# Patient Record
Sex: FEMALE | Employment: FULL TIME | URBAN - METROPOLITAN AREA
[De-identification: names, ages, dates, MRNs, and addresses within clinical notes are randomized per-mention and may not be internally consistent; named-entity substitution may affect disease eponyms.]

---

## 2024-10-08 ENCOUNTER — TELEPHONE (OUTPATIENT)
Dept: ORTHOPEDICS | Facility: CLINIC | Age: 49
End: 2024-10-08
Payer: COMMERCIAL

## 2024-10-08 NOTE — TELEPHONE ENCOUNTER
----- Message from Blanka Miller sent at 10/8/2024 11:15 AM CDT -----  Regarding: New Referral  Initial call completed, Fill-able uploaded     needed     No Specialist   No known allergies or skin conditions     Dental   -pt see dentist in [place] has appt on 10/9/2024  -advised pt to get dentist office phone and fax number

## 2024-10-23 ENCOUNTER — TELEPHONE (OUTPATIENT)
Dept: ORTHOPEDICS | Facility: CLINIC | Age: 49
End: 2024-10-23
Payer: COMMERCIAL

## 2024-10-23 RX ORDER — METFORMIN HYDROCHLORIDE 500 MG/1
500 TABLET, EXTENDED RELEASE ORAL
COMMUNITY
Start: 2024-08-08

## 2024-10-23 RX ORDER — OMEGA-3-ACID ETHYL ESTERS 1 G/1
2 CAPSULE, LIQUID FILLED ORAL 2 TIMES DAILY
COMMUNITY
Start: 2024-07-18

## 2024-10-23 RX ORDER — ROSUVASTATIN CALCIUM 10 MG/1
10 TABLET, COATED ORAL NIGHTLY
COMMUNITY
Start: 2024-08-08

## 2024-10-23 RX ORDER — IBUPROFEN 800 MG/1
800 TABLET ORAL 3 TIMES DAILY
COMMUNITY
Start: 2024-07-31

## 2024-10-23 NOTE — TELEPHONE ENCOUNTER
Language Line:  [phone] Larry    - no WC/litigation   - no active cancer/tx  - diabetic - A1c is 6.3; aware of program requirement  - BMI requirement discussed - current BMI 38.6  (63.5 in, 101.3kg)  - no nicotine use      Surgery:   needs both - start with  right Knee    Travel caregiver:  daughter    Will travel via: plane    Social Hx:     Updated in Epic              Patient works at [employer]            Plan to return home after surgery:  Yes            Have support to help with care and appointments: Yes     Allergies:  none    Medication list:    Updated in Epic    Covid-19:   - vaccine: no   - diagnosed with: yes, no hospitalization/O2 use/inhaler        Health Hx:   Updated in [facility]    Surgical Hx:    Updated in Epic    Can you take one flight of stairs: yes    RCRI:   - Coronary Artery Disease: no   - Congestive Heart Failure: no   - Cerebrovascular Disease: no   - Diabetes Mellitus on insulin: no      ROS:   - Fever/chills: no   - Infection: no   - Cough/Resp Issues:  no   - Bleeding/blood loss (vaginal, rectal, vomiting, urination):  no   - GI issues/Acid Reflux/Vomiting: no   - Fatty liver: no   - UTI: no   - Constipation:  yes occasionally - discussed p/o bowel mgmt plan   - MRSA colonization: no  - Skin issues (rashes, blisters, boils, bumps): no; will let us know if that changes   - Recent injection in operative joint: no   - Strokes or Passing out: no   - Blood Thinners or ASA: no   - Blood clot in heart/lung/extremity: no   - Stents: no   - Depression or Anxiety: no   - Problems with anesthesia:  no   - If female, having periods/pregnant: No  - Vision problems: yes; glasses   - Dental exam recently: yes    STOPBANG   - S  snore loudly no   - T  tired during the day no   - O  stop breathing in sleep no   - P  BP/HTN no   - B  BMI    - A  over 50 No   - N  neck size   - G  male gender No    Family Hx:    Updated in Epic        Knee replacement patients will have several weeks of PT after returning home.   Have the option for in person or virtual PT.  Wants in-person PT. She will call [employer] RN to help find an in-network PT provider.  Discussed stay, DME, ARMOND, p/o appts, PT - ample time allowed for question and answer, v/u of all teaching

## 2024-10-25 ENCOUNTER — DOCUMENTATION ONLY (OUTPATIENT)
Dept: INTERNAL MEDICINE | Facility: CLINIC | Age: 49
End: 2024-10-25
Payer: COMMERCIAL

## 2024-10-25 ENCOUNTER — TELEPHONE (OUTPATIENT)
Dept: ORTHOPEDICS | Facility: CLINIC | Age: 49
End: 2024-10-25
Payer: COMMERCIAL

## 2024-10-25 ENCOUNTER — PATIENT MESSAGE (OUTPATIENT)
Dept: ORTHOPEDICS | Facility: CLINIC | Age: 49
End: 2024-10-25
Payer: COMMERCIAL

## 2024-10-25 DIAGNOSIS — E11.9 TYPE 2 DIABETES MELLITUS WITHOUT COMPLICATION, WITHOUT LONG-TERM CURRENT USE OF INSULIN: ICD-10-CM

## 2024-10-25 DIAGNOSIS — E78.2 MIXED HYPERLIPIDEMIA: Primary | ICD-10-CM

## 2024-10-25 DIAGNOSIS — M17.12 PRIMARY OSTEOARTHRITIS OF LEFT KNEE: ICD-10-CM

## 2024-10-25 DIAGNOSIS — M17.11 PRIMARY OSTEOARTHRITIS OF RIGHT KNEE: Primary | ICD-10-CM

## 2024-10-25 DIAGNOSIS — M17.11 PRIMARY OSTEOARTHRITIS OF RIGHT KNEE: ICD-10-CM

## 2024-10-25 NOTE — PROGRESS NOTES
[facility]  Progress Note    Patient Name: Faith Bolton  MRN: [id number]  Date of Evaluation- 10/25/2024  PCP- No primary care provider on file.      This patient 's history was reviewed by chart in preparation for : Orthopedic Surgery    Medical problem list:  Mixed hyperlipidemia  Osteoarthritis of right knee  Obesity  Newly diagnosed DM  Constipation  Mildly elevated LFTs          Labs/EKG Review: Acceptable for surgery    Significant findings: None        Optimization:    PCP: 9/6/24  There are no significant issues that need to be addressed before scheduling procedure.      Thanks,          Georges Rabago NP  Perioperative Medicine  [facility]

## 2024-10-25 NOTE — TELEPHONE ENCOUNTER
Language Line Carolyne [phone]      Contacted patient - advised approved for surgery - offered dates - requesting 12/17  We discussed dates for stay, ARMOND, etc, will send portal message today with next steps     Pt will let me know if she has questions

## 2024-10-30 ENCOUNTER — TELEPHONE (OUTPATIENT)
Dept: ORTHOPEDICS | Facility: CLINIC | Age: 49
End: 2024-10-30
Payer: COMMERCIAL

## 2024-12-13 ENCOUNTER — ANESTHESIA EVENT (OUTPATIENT)
Dept: SURGERY | Facility: HOSPITAL | Age: 49
DRG: 470 | End: 2024-12-13
Payer: COMMERCIAL

## 2024-12-15 NOTE — HPI
This is a 49 y.o. female  who presents today with  for a preoperative evaluation in preparation for right knee arthroplasty.  Surgery is indicated for  osteoarthritis of right knee.   Patient is new to me.  The history has been obtained by speaking with the patient and reviewing the electronic medical record and/or outside health information. Significant health conditions for the perioperative period are discussed below in assessment and plan.   Patient reports current health status to be Good.  Denies any new symptoms before surgery.

## 2024-12-15 NOTE — ASSESSMENT & PLAN NOTE
Currently takes:   metformin    Most recent A1c is 6.3.      Reports peripheral neuropathy- right hand .   Denies visual changes. Up to date with eye exams.  Micro changes: Denies- Retinopathy, Nephropathy   Macro changes: Denies-  Carotid, Coronary , Peripheral disease

## 2024-12-15 NOTE — PROGRESS NOTES
[facility]  Progress Note    Patient Name: Faith Bolton  MRN: [id number]  Date of Evaluation- 12/16/2024  PCP- No primary care provider on file.    Future cases for Faith Veronica [[id number]]       Case ID Status Date Time Omar Procedure Provider Location    [id number] [facility] 12/17/2024  7:00  ARTHROPLASTY, KNEE, TOTAL, USING COMPUTER-ASSISTED NAVIGATION -  - Bull Justice MD [[id number]] [facility] OR            HPI:  This is a 49 y.o. female  who presents today with  for a preoperative evaluation in preparation for right knee arthroplasty.  Surgery is indicated for  osteoarthritis of right knee.   Patient is new to me.  The history has been obtained by speaking with the patient and reviewing the electronic medical record and/or outside health information. Significant health conditions for the perioperative period are discussed below in assessment and plan.   Patient reports current health status to be Good.  Denies any new symptoms before surgery.       Subjective/ Objective:     Chief Complaint: Preoperative evaulation, perioperative medical management, and complication reduction plan.     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       Anesthesia issues: PONV- hysterectomy    Difficulty mouth opening: No    Steroid use in the last 12 months:  No     Dental Issues: None    Family anesthesia difficulty: None         Family Hx of Thrombosis: None    Past Medical History:   Diagnosis Date    Adenomyosis     Anemia, unspecified     Mixed hyperlipidemia     Osteoarthritis of wrist     bilateral    Primary osteoarthritis of left knee     Primary osteoarthritis of right knee     Type 2 diabetes mellitus without complications     Uterine leiomyoma          Past Medical History Pertinent Negatives:   Diagnosis Date Noted    Anxiety 12/16/2024    Asthma 12/16/2024    COPD (chronic obstructive pulmonary disease)  "12/16/2024    Coronary artery disease 12/16/2024    Deep vein thrombosis 12/16/2024    Depression 12/16/2024    Disorder of kidney and ureter 12/16/2024    Hypertension 12/16/2024    Pulmonary embolism 12/16/2024    Seizures 12/16/2024    Stroke 12/16/2024    Thyroid disease 12/16/2024         Past Surgical History:   Procedure Laterality Date    BILATERAL TUBAL LIGATION      LAPAROSCOPY-ASSISTED VAGINAL HYSTERECTOMY         Review of Systems   Constitutional:  Negative for chills, fatigue, fever and unexpected weight change.   HENT:  Negative for congestion, hearing loss, rhinorrhea, sore throat, tinnitus and trouble swallowing.    Eyes:  Negative for visual disturbance.   Respiratory:  Negative for cough, chest tightness, shortness of breath and wheezing.    Cardiovascular:  Negative for chest pain, palpitations and leg swelling.   Gastrointestinal:  Positive for constipation (Fiber). Negative for diarrhea.        Denies Fatty liver, Hepatitis   Genitourinary:  Positive for urgency. Negative for decreased urine volume, difficulty urinating, dysuria, frequency and hematuria.        Incontinence   Musculoskeletal:  Positive for arthralgias (right knee). Negative for back pain, neck pain and neck stiffness.   Skin:  Negative for rash and wound.   Neurological:  Positive for numbness (right hand). Negative for dizziness, syncope, weakness and headaches.   Hematological:  Bruises/bleeds easily.   Psychiatric/Behavioral:  Negative for sleep disturbance and suicidal ideas.               VITALS  Visit Vitals  BP (!) 126/58 (BP Location: Left arm, Patient Position: Sitting)   Pulse (!) 58   Temp 97.9 °F (36.6 °C) (Oral)   Ht 5' 5" (1.651 m)   Wt 98 kg (216 lb 0.8 oz)   SpO2 98%   BMI 35.95 kg/m²          Physical Exam  Vitals reviewed.   Constitutional:       General: She is not in acute distress.     Appearance: She is well-developed. She is obese.   HENT:      Head: Normocephalic.      Nose: Nose normal.      " Mouth/Throat:      Pharynx: No oropharyngeal exudate.   Eyes:      General:         Right eye: No discharge.         Left eye: No discharge.      Conjunctiva/sclera: Conjunctivae normal.      Pupils: Pupils are equal, round, and reactive to light.   Neck:      Thyroid: No thyromegaly.      Vascular: No carotid bruit or JVD.      Trachea: No tracheal deviation.   Cardiovascular:      Rate and Rhythm: Regular rhythm. Bradycardia present.      Pulses:           Carotid pulses are 2+ on the right side and 2+ on the left side.       Dorsalis pedis pulses are 2+ on the right side and 2+ on the left side.        Posterior tibial pulses are 2+ on the right side and 2+ on the left side.      Heart sounds: Normal heart sounds. No murmur heard.  Pulmonary:      Effort: Pulmonary effort is normal. No respiratory distress.      Breath sounds: Normal breath sounds. No stridor. No wheezing, rhonchi or rales.   Abdominal:      General: Bowel sounds are normal. There is no distension.      Palpations: Abdomen is soft.      Tenderness: There is no guarding.   Musculoskeletal:      Cervical back: Normal range of motion. No pain with movement.      Right lower leg: No edema.      Left lower leg: No edema.   Lymphadenopathy:      Cervical: No cervical adenopathy.   Skin:     General: Skin is warm and dry.      Capillary Refill: Capillary refill takes less than 2 seconds.      Findings: No erythema or rash.   Neurological:      Mental Status: She is alert and oriented to person, place, and time.   Psychiatric:         Behavior: Behavior normal. Behavior is cooperative.          Significant Labs:          EKG:                 Active Cardiac Conditions: None      Revised Cardiac Risk Index   High -Risk Surgery  Intraperitoneal; Intrathoracic; suprainguinal vascular Yes- + 1 No- 0   History of Ischemic Heart Disease   (Hx of MI/positive exercise test/current chest pain due to ischemia/use of nitrate therapy/EKG with pathological Q waves)  Yes- + 1 No- 0   History of CHF  (Pulmonary edema/bilateral rales or S3 gallop/PND/CXR showing pulmonary vascular redistribution) Yes- + 1 No- 0   History of CVA   (Prior stroke or TIA) Yes- + 1 No- 0   Pre-operative treatment with insulin Yes- + 1 No- 0   Pre-operative creatinine > 2mg/dl Yes- + 1 No- 0   Total: 0      Risk Status:  Estimated risk of cardiac complications after non-cardiac surgery using the Revised Cardiac Risk Index for Preoperative risk is 3.9 %      ARISCAT (Canet) risk index: Low: 1.6% risk of post-op pulmonary complications; Intermediate: 13.3% risk of post-op pulmonary complications if surgery is > 3 hours.     American Society of Anesthesiologists Physical Status classification (ASA): 3                            Assessment/Plan:     Primary osteoarthritis of right knee  Scheduled with Dr. Donahue on 12/17/24 for right knee arthroplasty.    Mixed hyperlipidemia  Recommend weight loss, healthy diet (DASH/Mediterranean) and exercise.   Patient should exercise 30 minutes at least five times weekly once recovered from surgery.   Treated with: Crestor    Type 2 diabetes mellitus without complications  Currently takes:   metformin    Most recent A1c is 6.3.      Reports peripheral neuropathy- right hand .   Denies visual changes. Up to date with eye exams.  Micro changes: Denies- Retinopathy, Nephropathy   Macro changes: Denies-  Carotid, Coronary , Peripheral disease         Elevated LFTs  Per outside labs  AST= 42  ALT = 38  I recommend care with the usage of medications that are hepatotoxic or metabolized in the liver.  Continue monitoring with PCP    Acid reflux  Occasional symptoms- relieved with drinking more water.    Asymptomatic varicose veins of both lower extremities  Encouraged compression stockings to reduce leg fatigue, swelling, or pain.   Increased risk of thrombosis.      Discussion/Management of Perioperative Care    Thromboembolic prophylaxis (VTE) Care: Risk factors for  thrombosis include: obesity, surgical procedure, and varicose veins.  I recommend prophylaxis of thromboembolism with the use of compression stockings/pneumatic devices, and/or pharmacologic agents. The benefits should outweigh the risks for pharmacologic prophylaxis in the perioperative period. I also encourage early ambulation if not contraindicated during the post-operative period.    Risk factors for post-operative pulmonary complications include:diabetes mellitus. To reduce the risk of pulmonary complications, prophylactic recommendations include: early ambulation and pain control.    Risk factors for renal complications include: diabetes mellitus. To reduce the risk of postoperative renal complications, I recommend the patient maintain adequate hydration.  Avoid/reduce NSAIDS and CALERO-2 inhibitors use as well as IV contrast for renal protection.    I recommend the use of appropriate prophylactic antibiotics to reduce the risk of surgical site infections.      The patient is at an increased risk for urinary retention due to : possible regional anesthesia. I recommend to avoid/decrease the use of benzodiazepines, anticholinergics, and Benadryl in the perioperative period. I also recommend using opioids for the shortest period of time if possible.          This visit was focused on Preoperative evaluation, Perioperative Medical management, complication reduction plans. I suggest that the patient follows up with primary care or relevant sub specialists for ongoing health care.    I appreciate the opportunity to be involved in this patients care. Please feel free to contact me if there were any questions about this consultation.      I spent a total of 64 minutes on the day of the visit.  This includes face to face time and non-face to face time preparing to see the patient (e.g., review of tests), obtaining and/or reviewing separately obtained history, documenting clinical information in the electronic or other  health record, independently interpreting results and communicating results to the patient/family/caregiver, or care coordinator.       Patient is optimized for surgery.      Georges Rabago NP  Perioperative Medicine  [facility]

## 2024-12-15 NOTE — OUTPATIENT SUBJECTIVE & OBJECTIVE
Outpatient Subjective & Objective      Chief Complaint: Preoperative evaulation, perioperative medical management, and complication reduction plan.     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       Anesthesia issues: PONV- hysterectomy    Difficulty mouth opening: No    Steroid use in the last 12 months:  No     Dental Issues: None    Family anesthesia difficulty: None         Family Hx of Thrombosis: None    Past Medical History:   Diagnosis Date    Adenomyosis     Anemia, unspecified     Mixed hyperlipidemia     Osteoarthritis of wrist     bilateral    Primary osteoarthritis of left knee     Primary osteoarthritis of right knee     Type 2 diabetes mellitus without complications     Uterine leiomyoma          Past Medical History Pertinent Negatives:   Diagnosis Date Noted    Anxiety 12/16/2024    Asthma 12/16/2024    COPD (chronic obstructive pulmonary disease) 12/16/2024    Coronary artery disease 12/16/2024    Deep vein thrombosis 12/16/2024    Depression 12/16/2024    Disorder of kidney and ureter 12/16/2024    Hypertension 12/16/2024    Pulmonary embolism 12/16/2024    Seizures 12/16/2024    Stroke 12/16/2024    Thyroid disease 12/16/2024         Past Surgical History:   Procedure Laterality Date    BILATERAL TUBAL LIGATION      LAPAROSCOPY-ASSISTED VAGINAL HYSTERECTOMY         Review of Systems   Constitutional:  Negative for chills, fatigue, fever and unexpected weight change.   HENT:  Negative for congestion, hearing loss, rhinorrhea, sore throat, tinnitus and trouble swallowing.    Eyes:  Negative for visual disturbance.   Respiratory:  Negative for cough, chest tightness, shortness of breath and wheezing.    Cardiovascular:  Negative for chest pain, palpitations and leg swelling.   Gastrointestinal:  Positive for constipation (Fiber). Negative for diarrhea.        Denies Fatty liver, Hepatitis   Genitourinary:  Positive for urgency. Negative  "for decreased urine volume, difficulty urinating, dysuria, frequency and hematuria.        Incontinence   Musculoskeletal:  Positive for arthralgias (right knee). Negative for back pain, neck pain and neck stiffness.   Skin:  Negative for rash and wound.   Neurological:  Positive for numbness (right hand). Negative for dizziness, syncope, weakness and headaches.   Hematological:  Bruises/bleeds easily.   Psychiatric/Behavioral:  Negative for sleep disturbance and suicidal ideas.               VITALS  Visit Vitals  BP (!) 126/58 (BP Location: Left arm, Patient Position: Sitting)   Pulse (!) 58   Temp 97.9 °F (36.6 °C) (Oral)   Ht 5' 5" (1.651 m)   Wt 98 kg (216 lb 0.8 oz)   SpO2 98%   BMI 35.95 kg/m²          Physical Exam  Vitals reviewed.   Constitutional:       General: She is not in acute distress.     Appearance: She is well-developed. She is obese.   HENT:      Head: Normocephalic.      Nose: Nose normal.      Mouth/Throat:      Pharynx: No oropharyngeal exudate.   Eyes:      General:         Right eye: No discharge.         Left eye: No discharge.      Conjunctiva/sclera: Conjunctivae normal.      Pupils: Pupils are equal, round, and reactive to light.   Neck:      Thyroid: No thyromegaly.      Vascular: No carotid bruit or JVD.      Trachea: No tracheal deviation.   Cardiovascular:      Rate and Rhythm: Regular rhythm. Bradycardia present.      Pulses:           Carotid pulses are 2+ on the right side and 2+ on the left side.       Dorsalis pedis pulses are 2+ on the right side and 2+ on the left side.        Posterior tibial pulses are 2+ on the right side and 2+ on the left side.      Heart sounds: Normal heart sounds. No murmur heard.  Pulmonary:      Effort: Pulmonary effort is normal. No respiratory distress.      Breath sounds: Normal breath sounds. No stridor. No wheezing, rhonchi or rales.   Abdominal:      General: Bowel sounds are normal. There is no distension.      Palpations: Abdomen is soft.     "  Tenderness: There is no guarding.   Musculoskeletal:      Cervical back: Normal range of motion. No pain with movement.      Right lower leg: No edema.      Left lower leg: No edema.   Lymphadenopathy:      Cervical: No cervical adenopathy.   Skin:     General: Skin is warm and dry.      Capillary Refill: Capillary refill takes less than 2 seconds.      Findings: No erythema or rash.   Neurological:      Mental Status: She is alert and oriented to person, place, and time.   Psychiatric:         Behavior: Behavior normal. Behavior is cooperative.          Significant Labs:          EKG:                 Active Cardiac Conditions: None      Revised Cardiac Risk Index   High -Risk Surgery  Intraperitoneal; Intrathoracic; suprainguinal vascular Yes- + 1 No- 0   History of Ischemic Heart Disease   (Hx of MI/positive exercise test/current chest pain due to ischemia/use of nitrate therapy/EKG with pathological Q waves) Yes- + 1 No- 0   History of CHF  (Pulmonary edema/bilateral rales or S3 gallop/PND/CXR showing pulmonary vascular redistribution) Yes- + 1 No- 0   History of CVA   (Prior stroke or TIA) Yes- + 1 No- 0   Pre-operative treatment with insulin Yes- + 1 No- 0   Pre-operative creatinine > 2mg/dl Yes- + 1 No- 0   Total: 0      Risk Status:  Estimated risk of cardiac complications after non-cardiac surgery using the Revised Cardiac Risk Index for Preoperative risk is 3.9 %      ARISCAT (Canet) risk index: Low: 1.6% risk of post-op pulmonary complications; Intermediate: 13.3% risk of post-op pulmonary complications if surgery is > 3 hours.     American Society of Anesthesiologists Physical Status classification (ASA): 3             Outpatient Subjective & Objective

## 2024-12-16 ENCOUNTER — OFFICE VISIT (OUTPATIENT)
Dept: ORTHOPEDICS | Facility: CLINIC | Age: 49
End: 2024-12-16
Payer: COMMERCIAL

## 2024-12-16 ENCOUNTER — HOSPITAL ENCOUNTER (OUTPATIENT)
Dept: RADIOLOGY | Facility: HOSPITAL | Age: 49
Discharge: HOME | End: 2024-12-16
Attending: ORTHOPAEDIC SURGERY
Payer: COMMERCIAL

## 2024-12-16 ENCOUNTER — TELEPHONE (OUTPATIENT)
Dept: ORTHOPEDICS | Facility: CLINIC | Age: 49
End: 2024-12-16

## 2024-12-16 ENCOUNTER — OFFICE VISIT (OUTPATIENT)
Dept: INTERNAL MEDICINE | Facility: CLINIC | Age: 49
End: 2024-12-16
Payer: COMMERCIAL

## 2024-12-16 VITALS — WEIGHT: 221 LBS | BODY MASS INDEX: 37.73 KG/M2 | HEIGHT: 64 IN

## 2024-12-16 VITALS
HEIGHT: 65 IN | BODY MASS INDEX: 36 KG/M2 | DIASTOLIC BLOOD PRESSURE: 58 MMHG | WEIGHT: 216.06 LBS | HEART RATE: 58 BPM | TEMPERATURE: 98 F | OXYGEN SATURATION: 98 % | SYSTOLIC BLOOD PRESSURE: 126 MMHG

## 2024-12-16 DIAGNOSIS — Z96.651 S/P TOTAL KNEE REPLACEMENT, RIGHT: ICD-10-CM

## 2024-12-16 DIAGNOSIS — R79.89 ELEVATED LFTS: ICD-10-CM

## 2024-12-16 DIAGNOSIS — I83.93 ASYMPTOMATIC VARICOSE VEINS OF BOTH LOWER EXTREMITIES: ICD-10-CM

## 2024-12-16 DIAGNOSIS — M17.11 PRIMARY OSTEOARTHRITIS OF RIGHT KNEE: ICD-10-CM

## 2024-12-16 DIAGNOSIS — Z01.818 PREOPERATIVE EXAMINATION: Primary | ICD-10-CM

## 2024-12-16 DIAGNOSIS — E78.2 MIXED HYPERLIPIDEMIA: ICD-10-CM

## 2024-12-16 DIAGNOSIS — K21.9 GASTROESOPHAGEAL REFLUX DISEASE, UNSPECIFIED WHETHER ESOPHAGITIS PRESENT: ICD-10-CM

## 2024-12-16 DIAGNOSIS — M17.11 PRIMARY OSTEOARTHRITIS OF RIGHT KNEE: Primary | ICD-10-CM

## 2024-12-16 DIAGNOSIS — M17.12 PRIMARY OSTEOARTHRITIS OF LEFT KNEE: ICD-10-CM

## 2024-12-16 DIAGNOSIS — E11.9 TYPE 2 DIABETES MELLITUS WITHOUT COMPLICATION, WITHOUT LONG-TERM CURRENT USE OF INSULIN: ICD-10-CM

## 2024-12-16 PROCEDURE — 99999 PR PBB SHADOW E&M-EST. PATIENT-LVL III: CPT | Mod: PBBFAC,COE,, | Performed by: NURSE PRACTITIONER

## 2024-12-16 PROCEDURE — 99999 PR PBB SHADOW E&M-EST. PATIENT-LVL III: CPT | Mod: PBBFAC,COE,, | Performed by: ORTHOPAEDIC SURGERY

## 2024-12-16 PROCEDURE — 73700 CT LOWER EXTREMITY W/O DYE: CPT | Mod: TC,RT

## 2024-12-16 PROCEDURE — 73562 X-RAY EXAM OF KNEE 3: CPT | Mod: TC,50

## 2024-12-16 PROCEDURE — 99499 UNLISTED E&M SERVICE: CPT | Mod: S$GLB,COE,, | Performed by: ORTHOPAEDIC SURGERY

## 2024-12-16 PROCEDURE — 99205 OFFICE O/P NEW HI 60 MIN: CPT | Mod: S$GLB,COE,, | Performed by: NURSE PRACTITIONER

## 2024-12-16 PROCEDURE — 99499 UNLISTED E&M SERVICE: CPT | Mod: S$GLB,COE,, | Performed by: NURSE PRACTITIONER

## 2024-12-16 RX ORDER — SODIUM CHLORIDE 9 MG/ML
INJECTION, SOLUTION INTRAVENOUS
Status: CANCELLED | OUTPATIENT
Start: 2024-12-16

## 2024-12-16 RX ORDER — AMOXICILLIN 250 MG
1 CAPSULE ORAL 2 TIMES DAILY
Status: CANCELLED | OUTPATIENT
Start: 2024-12-16

## 2024-12-16 RX ORDER — POLYETHYLENE GLYCOL 3350 17 G/17G
17 POWDER, FOR SOLUTION ORAL DAILY
Status: CANCELLED | OUTPATIENT
Start: 2024-12-17

## 2024-12-16 RX ORDER — SODIUM CHLORIDE 0.9 % (FLUSH) 0.9 %
10 SYRINGE (ML) INJECTION
Status: CANCELLED | OUTPATIENT
Start: 2024-12-16

## 2024-12-16 RX ORDER — METHOCARBAMOL 750 MG/1
750 TABLET, FILM COATED ORAL 3 TIMES DAILY
Status: CANCELLED | OUTPATIENT
Start: 2024-12-16

## 2024-12-16 RX ORDER — OXYCODONE HYDROCHLORIDE 5 MG/1
5 TABLET ORAL
Status: CANCELLED | OUTPATIENT
Start: 2024-12-16

## 2024-12-16 RX ORDER — AMOXICILLIN 250 MG
1 CAPSULE ORAL DAILY
Qty: 30 TABLET | Refills: 0 | Status: SHIPPED | OUTPATIENT
Start: 2024-12-16

## 2024-12-16 RX ORDER — ONDANSETRON HYDROCHLORIDE 2 MG/ML
4 INJECTION, SOLUTION INTRAVENOUS EVERY 8 HOURS PRN
Status: CANCELLED | OUTPATIENT
Start: 2024-12-16

## 2024-12-16 RX ORDER — PROCHLORPERAZINE EDISYLATE 5 MG/ML
5 INJECTION INTRAMUSCULAR; INTRAVENOUS EVERY 6 HOURS PRN
Status: CANCELLED | OUTPATIENT
Start: 2024-12-16

## 2024-12-16 RX ORDER — CELECOXIB 200 MG/1
200 CAPSULE ORAL DAILY
Qty: 30 CAPSULE | Refills: 0 | Status: SHIPPED | OUTPATIENT
Start: 2024-12-16

## 2024-12-16 RX ORDER — PREGABALIN 75 MG/1
75 CAPSULE ORAL
Status: CANCELLED | OUTPATIENT
Start: 2024-12-16

## 2024-12-16 RX ORDER — ACETAMINOPHEN 500 MG
1000 TABLET ORAL EVERY 6 HOURS
Status: CANCELLED | OUTPATIENT
Start: 2024-12-16

## 2024-12-16 RX ORDER — ACETAMINOPHEN 500 MG
1000 TABLET ORAL
Status: CANCELLED | OUTPATIENT
Start: 2024-12-16

## 2024-12-16 RX ORDER — CELECOXIB 200 MG/1
400 CAPSULE ORAL ONCE
Status: CANCELLED | OUTPATIENT
Start: 2024-12-16 | End: 2024-12-16

## 2024-12-16 RX ORDER — OXYCODONE HYDROCHLORIDE 5 MG/1
TABLET ORAL
Qty: 50 TABLET | Refills: 0 | Status: SHIPPED | OUTPATIENT
Start: 2024-12-16

## 2024-12-16 RX ORDER — MUPIROCIN 20 MG/G
1 OINTMENT TOPICAL 2 TIMES DAILY
Status: CANCELLED | OUTPATIENT
Start: 2024-12-16 | End: 2024-12-21

## 2024-12-16 RX ORDER — ASPIRIN 81 MG/1
81 TABLET ORAL 2 TIMES DAILY
Qty: 60 TABLET | Refills: 0 | Status: SHIPPED | OUTPATIENT
Start: 2024-12-16 | End: 2025-01-17

## 2024-12-16 RX ORDER — MORPHINE SULFATE 2 MG/ML
2 INJECTION, SOLUTION INTRAMUSCULAR; INTRAVENOUS
Status: CANCELLED | OUTPATIENT
Start: 2024-12-16

## 2024-12-16 RX ORDER — FENTANYL CITRATE 50 UG/ML
100 INJECTION, SOLUTION INTRAMUSCULAR; INTRAVENOUS
Status: CANCELLED | OUTPATIENT
Start: 2024-12-16 | End: 2024-12-17

## 2024-12-16 RX ORDER — OXYCODONE HYDROCHLORIDE 5 MG/1
10 TABLET ORAL
Status: CANCELLED | OUTPATIENT
Start: 2024-12-16

## 2024-12-16 RX ORDER — DEXTROMETHORPHAN HYDROBROMIDE, GUAIFENESIN 5; 100 MG/5ML; MG/5ML
650 LIQUID ORAL EVERY 8 HOURS
Qty: 120 TABLET | Refills: 0 | Status: SHIPPED | OUTPATIENT
Start: 2024-12-16

## 2024-12-16 RX ORDER — PANTOPRAZOLE SODIUM 40 MG/1
40 TABLET, DELAYED RELEASE ORAL DAILY
Qty: 30 TABLET | Refills: 0 | Status: SHIPPED | OUTPATIENT
Start: 2024-12-16 | End: 2025-01-17

## 2024-12-16 RX ORDER — METHOCARBAMOL 750 MG/1
750 TABLET, FILM COATED ORAL 4 TIMES DAILY PRN
Qty: 40 TABLET | Refills: 0 | Status: SHIPPED | OUTPATIENT
Start: 2024-12-16 | End: 2024-12-28

## 2024-12-16 RX ORDER — FENTANYL CITRATE 50 UG/ML
25 INJECTION, SOLUTION INTRAMUSCULAR; INTRAVENOUS EVERY 5 MIN PRN
Status: CANCELLED | OUTPATIENT
Start: 2024-12-16

## 2024-12-16 RX ORDER — ASPIRIN 81 MG/1
81 TABLET ORAL 2 TIMES DAILY
Status: CANCELLED | OUTPATIENT
Start: 2024-12-16

## 2024-12-16 RX ORDER — PREGABALIN 75 MG/1
75 CAPSULE ORAL NIGHTLY
Status: CANCELLED | OUTPATIENT
Start: 2024-12-16

## 2024-12-16 RX ORDER — FAMOTIDINE 20 MG/1
20 TABLET, FILM COATED ORAL 2 TIMES DAILY
Status: CANCELLED | OUTPATIENT
Start: 2024-12-16

## 2024-12-16 RX ORDER — MIDAZOLAM HYDROCHLORIDE 1 MG/ML
4 INJECTION, SOLUTION INTRAMUSCULAR; INTRAVENOUS
Status: CANCELLED | OUTPATIENT
Start: 2024-12-16 | End: 2024-12-17

## 2024-12-16 RX ORDER — CELECOXIB 200 MG/1
200 CAPSULE ORAL DAILY
Status: CANCELLED | OUTPATIENT
Start: 2024-12-17

## 2024-12-16 RX ORDER — BISACODYL 10 MG/1
10 SUPPOSITORY RECTAL EVERY 12 HOURS PRN
Status: CANCELLED | OUTPATIENT
Start: 2024-12-16

## 2024-12-16 RX ORDER — MUPIROCIN 20 MG/G
1 OINTMENT TOPICAL
Status: CANCELLED | OUTPATIENT
Start: 2024-12-16

## 2024-12-16 RX ORDER — SODIUM CHLORIDE 9 MG/ML
INJECTION, SOLUTION INTRAVENOUS CONTINUOUS
Status: CANCELLED | OUTPATIENT
Start: 2024-12-16 | End: 2024-12-17

## 2024-12-16 RX ORDER — NALOXONE HCL 0.4 MG/ML
0.02 VIAL (ML) INJECTION
Status: CANCELLED | OUTPATIENT
Start: 2024-12-16

## 2024-12-16 RX ORDER — TALC
6 POWDER (GRAM) TOPICAL NIGHTLY PRN
Status: CANCELLED | OUTPATIENT
Start: 2024-12-16

## 2024-12-16 RX ORDER — LIDOCAINE HYDROCHLORIDE 10 MG/ML
1 INJECTION, SOLUTION EPIDURAL; INFILTRATION; INTRACAUDAL; PERINEURAL
Status: CANCELLED | OUTPATIENT
Start: 2024-12-16

## 2024-12-16 NOTE — PROGRESS NOTES
Faith Bolton is a 49 y.o. year old here today for preoperative visit in preparation for a Right total knee arthroplasty to be performed by Dr. Donahue on 12/17/24.  she was last seen and treated in the clinic on 12/16/2024. she will be medically optimized by the pre op center. There has been no significant change in medical status since last visit. No fever, chills, malaise, or unexplained weight change.      Allergies, Medications, past medical and surgical history reviewed.    Focused examination performed.    Patient saw surgeon in clinic today. All questions answered. Patient encouraged to call with questions. Contact information given.     Pre, sal, and post operative procedures and expectations discussed. Goals of successful surgery reviewed and include manageable pain levels, surgical site free of infection, medication management, and ambulation with PT/OT assistance. Healthy weight management discussed with patient and caregiver who were receptive to eduction of healthy diet and activity. No other necessary lifestyle changes identified. Educated patient about signs and symptoms of infection, medication management, anticoagulation therapy, risk of tobacco and alcohol use, and self-care to promote healing. Surgical guide given for future reference. Hibiclens given to patient with instructions. All questions were answered.     aFith Bolton verbalized an understanding to the education and goals. Patient has displayed readiness to engage in care and is ready to proceed with surgery.  Patient reports her daughter is able and ready to provide assistance at home after discharge.    Surgical and blood consents signed.    DME will be arranged. The mobility limitation cannot be sufficiently resolved by the use of a cane. Patient's functional mobility deficit can be sufficiently resolved with the use of a (Rolling Walker or Walker). Patient's mobility limitation significantly impairs their ability to  "participate in one of more activities of daily living. The use of a (Rolling Walker or Walker) will significantly improve the patient's ability to participate in MRADLS and the patient will use it on regular basis in the home."     Faith Bolton will contact us if there are any questions, concerns, or changes in medical status prior to surgery.       Joint class: private with [facility]. Citizen of [place] booklet provided.    Patient has discussed discharge planning with surgeon. Patient will be discharged to home following surgery.   patient will be scheduled with [facility]. PT will be done at the [facility].    30 minutes of time was spent on patient education, review of records, templating, H&P, , appointment scheduling and optimizing patient for surgery.      "

## 2024-12-16 NOTE — DISCHARGE INSTRUCTIONS
Your surgery has been scheduled for:_____12/17/24_____________________________________    You should report to:  ____[facility], located on the [place] side of the first floor of the           [facility] ([phone])  ____[facility], located on the [facility] side of the            Second floor of the [facility] [phone])  ____3rd Floor [facility] located on the [facility] side of the [facility] [phone]  __X__[facility]: located at [address] [place] [address]. [address]     Please Note   Tell your doctor if you take Aspirin, products containing Aspirin, herbal medications  or blood thinners, such as Coumadin, Ticlid, or Plavix.  (Consult your provider regarding holding or stopping before surgery).  Arrange for someone to drive you home following surgery.  You will not be allowed to leave the surgical facility alone or drive yourself home following sedation and anesthesia.    Before Surgery  Stop taking all herbal medications, vitamins, and supplements 7 days prior to surgery  No Motrin/Advil (Ibuprofen) 7 days before surgery  No Aleve (Naproxen) 7 days before surgery   No Goody's/BC Powder 7 days before surgery  Refrain from drinking alcoholic beverages for 24 hours before and after surgery  Stop or limit smoking at least 24 hours prior to surgery  You may take Tylenol for pain    Night before Surgery  Do not eat or drink after midnight  Take a shower or bath (shower is recommended).  Bathe with Hibiclens soap or an antibacterial soap from the neck down.  If not supplied by your surgeon, hibiclens soap will need to be purchased over the counter in pharmacy.  Rinse soap off thoroughly.  Shampoo your hair with your regular shampoo    The Day of Surgery  Take another bath or shower with hibiclens or any antibacterial soap, to reduce the chance of infection.  Take heart  and blood pressure medications with a small sip of water, as advised by the perioperative team.  Do not take fluid pills  You may brush your teeth and rinse your mouth, but do not swallow any additional water.   Do not apply perfumes, powder, body lotions or deodorant on the day of surgery.  Nail polish should be removed.  Do not wear makeup or moisturizer  Wear comfortable clothes, such as a button front shirt and loose fitting pants.  Leave all jewelry, including body piercings, and valuables at home.    Bring any devices you will need after surgery such as crutches or canes.  If you have sleep apnea, please bring your CPAP machine  In the event that your physical condition changes including the onset of a cold or respiratory illness, or if you have to delay or cancel your surgery, please notify your surgeon.

## 2024-12-16 NOTE — ASSESSMENT & PLAN NOTE
Per outside labs  AST= 42  ALT = 38  I recommend care with the usage of medications that are hepatotoxic or metabolized in the liver.  Continue monitoring with PCP

## 2024-12-16 NOTE — PROGRESS NOTES
Subjective:      Patient ID: Faith Bolton is a 49 y.o. female.    Chief Complaint: Pain of the Right Knee  VISIT DONE WITH     History of Present Illness    CHIEF COMPLAINT:  - Faith presents today for evaluation of right knee pain that has been present for approximately four years.    HPI:  - presents with right knee pain ongoing for approximately four years  - reports pain in the medial side of the right knee that radiates down her leg  - feels like the knee is going to give way when walking, indicating stability issues  - experiences difficulties with various activities: walking for extended periods, going up and down stairs, getting in and out of a car, and getting dressed  - has received injections as treatment, with the last injection providing relief for a few months  - last HbA1c test was performed about three months ago  - denies any history of blood clots    WORK STATUS:  - Works as a cook at [employer]  - Stands for 9 hours a day  - Knee pain affecting mobility at work  - Difficulty walking for long periods         Past Medical History:   Diagnosis Date    Adenomyosis     Anemia, unspecified     Mixed hyperlipidemia     Osteoarthritis of wrist     bilateral    Primary osteoarthritis of left knee     Primary osteoarthritis of right knee     Type 2 diabetes mellitus without complications     Uterine leiomyoma      Past Surgical History:   Procedure Laterality Date    BILATERAL TUBAL LIGATION      LAPAROSCOPY-ASSISTED VAGINAL HYSTERECTOMY       Family History   Problem Relation Name Age of Onset    Osteoarthritis Mother      Hypertension Mother      Diabetes Mother      No Known Problems Father      Diabetes Brother       Social History     Socioeconomic History    Marital status: Unknown   Tobacco Use    Smoking status: Never    Smokeless tobacco: Never   Substance and Sexual Activity    Alcohol use: Not Currently    Drug use: Never     Social Drivers of Health     Financial Resource  Strain: Medium Risk (12/9/2024)    Overall Financial Resource Strain (CARDIA)     Difficulty of Paying Living Expenses: Somewhat hard   Food Insecurity: No Food Insecurity (12/9/2024)    Hunger Vital Sign     Worried About Running Out of Food in the Last Year: Never true     Ran Out of Food in the Last Year: Never true   Physical Activity: Insufficiently Active (12/9/2024)    Exercise Vital Sign     Days of Exercise per Week: 2 days     Minutes of Exercise per Session: 10 min   Stress: No Stress Concern Present (12/9/2024)    [employer] - Occupational Stress Questionnaire     Feeling of Stress : Only a little   Housing Stability: Unknown (12/9/2024)    Housing Stability Vital Sign     Unable to Pay for Housing in the Last Year: No     Current Outpatient Medications on File Prior to Visit   Medication Sig Dispense Refill    ibuprofen (ADVIL,MOTRIN) 800 MG tablet Take 800 mg by mouth 3 (three) times daily.      metFORMIN (GLUCOPHAGE-XR) 500 MG ER 24hr tablet Take 500 mg by mouth.      omega-3 acid ethyl esters (LOVAZA) 1 gram capsule Take 2 capsules by mouth 2 (two) times daily.      rosuvastatin (CRESTOR) 10 MG tablet Take 10 mg by mouth every evening.       No current facility-administered medications on file prior to visit.     Review of patient's allergies indicates:  No Known Allergies    Review of Systems   Constitutional: Negative for chills, fever and night sweats.   HENT:  Negative for hearing loss.    Eyes:  Negative for blurred vision and double vision.   Cardiovascular:  Negative for chest pain, claudication and leg swelling.   Respiratory:  Negative for shortness of breath.    Endocrine: Negative for polydipsia, polyphagia and polyuria.   Hematologic/Lymphatic: Negative for adenopathy and bleeding problem. Does not bruise/bleed easily.   Skin:  Negative for poor wound healing.   Gastrointestinal:  Negative for diarrhea and heartburn.   Genitourinary:  Negative for bladder  "incontinence.   Neurological:  Negative for focal weakness, headaches, numbness, paresthesias and sensory change.   Psychiatric/Behavioral:  The patient is not nervous/anxious.    Allergic/Immunologic: Negative for persistent infections.         Objective:      Body mass index is 37.73 kg/m².  Vitals:    24 1417   Weight: 100.3 kg (221 lb 0.2 oz)   Height: 5' 4.17" (1.63 m)         General    Constitutional: She is oriented to person, place, and time. She appears well-developed and well-nourished.   HENT:   Head: Normocephalic and atraumatic.   Eyes: EOM are normal.   Cardiovascular:  Normal rate.            Pulmonary/Chest: Effort normal.   Neurological: She is alert and oriented to person, place, and time.   Psychiatric: She has a normal mood and affect. Her behavior is normal.     General Musculoskeletal Exam   Gait: normal       Right Knee Exam     Inspection   Erythema: absent  Scars: absent  Swelling: present  Effusion: absent  Deformity: absent  Bruising: absent    Tenderness   The patient is tender to palpation of the medial joint line and patella.    Range of Motion   Extension:  0   Flexion:  120     Tests   Ligament Examination   Lachman: normal (-1 to 2mm)   MCL - Valgus: normal (0 to 2mm)  LCL - Varus: normal  Patella   Passive Patellar Tilt: neutral    Other   Sensation: normal    Left Knee Exam     Inspection   Erythema: absent  Scars: absent  Swelling: absent  Effusion: absent  Deformity: absent  Bruising: absent    Tenderness   The patient is experiencing no tenderness.     Range of Motion   Extension:  0   Flexion:  130     Tests   Stability   Lachman: normal (-1 to 2mm)   MCL - Valgus: normal (0 to 2mm)  LCL - Varus: normal (0 to 2mm)  Patella   Passive Patellar Tilt: neutral    Other   Sensation: normal    Muscle Strength   Right Lower Extremity   Hip Abduction: 5/5   Quadriceps:  5/5   Hamstrin/5   Left Lower Extremity   Hip Abduction: 5/5   Quadriceps:  5/5   Hamstrin/5 "     Vascular Exam     Right Pulses  Dorsalis Pedis:      2+          Left Pulses  Dorsalis Pedis:      2+          Edema  Right Lower Leg: absent  Left Lower Leg: absent      Reviewed by me:  IMAGING:  - Knee radiographs right knee: Kellgren-Jose grade 3 changes with the medial compartment being most involved and significant patellofemoral degenerative change, subchondral cysts and sclerosis, very mild varus deformity,        Assessment:       Encounter Diagnosis   Name Primary?    Primary osteoarthritis of right knee Yes          Plan:       Faith was seen today for pain.    Diagnoses and all orders for this visit:    Primary osteoarthritis of right knee      Treatment options were discussed. The surgical process of robotically assisted right knee replacement was discussed in detail with the patient including a detailed discussion of the procedure itself (including visual model, x-ray review, and literature review). We discussed options including implant type and why I believe the implant selected is a good match for the patient's needs. The patient expressed understanding and agrees to proceed with the planned surgeryThe typical perioperative and post-operative course was discussed and perioperative risks were discussed to the patient's satisfaction.  Risks and complications discussed included but were not limited to the risks of anesthetic complications, infection, bleeding, wound healing complications, fracture through the pin sights, stiffness, aseptic loosening, instability, limb length inequality, neurologic dysfunction including numbness and weakness, additional surgery,  DVT, pulmonary embolism, perioperative medical risks (cardiac, pulmonary, renal, neurologic), and death and the patient elects to proceed.  The patient should get medically cleared and attend the joint seminar.      ASA for DVT prophylaxis    STERLING            This note was generated with the assistance of ambient listening technology.  Verbal consent was obtained by the patient and accompanying visitor(s) for the recording of patient appointment to facilitate this note. I attest to having reviewed and edited the generated note for accuracy, though some syntax or spelling errors may persist. Please contact the author of this note for any clarification.

## 2024-12-16 NOTE — H&P
CC:  Right knee pain    Faith Bolton is a 49 y.o. female with history of Right knee pain. Pain is worse with activity and weight bearing.  Patient has experienced interference of activities of daily living due to decreased range of motion and an increase in joint pain and swelling.  Patient has failed non-operative treatment including NSAIDs, corticosteroid injections, viscosupplement injections, and activity modification.  Faith Bolton currently ambulates independently.     Relevant medical conditions of significance in perioperative period:  DM- on medication managed by pcp  HLD- on medication managed by pcp     Given documented medical comorbidities including those listed above and based off of CMS criteria patient would meet inpatient admission status guidelines. This case has been approved as inpatient.     Past Medical History:   Diagnosis Date    Adenomyosis     Anemia, unspecified     Mixed hyperlipidemia     Osteoarthritis of wrist     bilateral    Primary osteoarthritis of left knee     Primary osteoarthritis of right knee     Type 2 diabetes mellitus without complications     Uterine leiomyoma        Past Surgical History:   Procedure Laterality Date    BILATERAL TUBAL LIGATION      LAPAROSCOPY-ASSISTED VAGINAL HYSTERECTOMY         Family History   Problem Relation Name Age of Onset    Osteoarthritis Mother      Hypertension Mother      Diabetes Mother      No Known Problems Father      Diabetes Brother         Review of patient's allergies indicates:  No Known Allergies      Current Outpatient Medications:     ergocalciferol, vitamin D2, (VITAMIN D2 ORAL), Take 1.25 mg by mouth. weekly, Disp: , Rfl:     ibuprofen (ADVIL,MOTRIN) 800 MG tablet, Take 800 mg by mouth 3 (three) times daily., Disp: , Rfl:     metFORMIN (GLUCOPHAGE-XR) 500 MG ER 24hr tablet, Take 500 mg by mouth., Disp: , Rfl:     omega-3 acid ethyl esters (LOVAZA) 1 gram capsule, Take 2 capsules by mouth 2 (two) times daily.,  Disp: , Rfl:     rosuvastatin (CRESTOR) 10 MG tablet, Take 10 mg by mouth every evening., Disp: , Rfl:     Review of Systems:  Constitutional: no fever or chills  Eyes: no visual changes  ENT: no nasal congestion or sore throat  Respiratory: no cough or shortness of breath  Cardiovascular: no chest pain or palpitations  Gastrointestinal: no nausea or vomiting, tolerating diet  Genitourinary: no hematuria or dysuria  Integument/Breast: no rash or pruritis  Hematologic/Lymphatic: no easy bruising or lymphadenopathy  Musculoskeletal: positive for knee pain  Neurological: no seizures or tremors  Behavioral/Psych: no auditory or visual hallucinations  Endocrine: no heat or cold intolerance    PE:  There were no vitals taken for this visit.  General: Pleasant, cooperative, NAD   Gait: antalgic  HEENT: NCAT, sclera nonicteric   Lungs: Respirations clear bilaterally; equal and unlabored.   CV: S1S2; 2+ bilateral upper and lower extremity pulses.   Skin: Intact throughout with no rashes, erythema, or lesions  Extremities: No LE edema,  no erythema or warmth of the skin in either lower extremity.    Right knee exam:  Knee Range of Motion:normal, pain with passive range of motion  Effusion:none  Condition of skin:intact  Location of tenderness:Medial joint line   Strength:normal  Stability: stable to testing    Hip Examination: painless PROM of hip     Radiographs: Radiographs reveal advanced degenerative changes including subchondral cyst formation, subchondral sclerosis, osteophyte formation, joint space narrowing.     Knee Alignment: normal    Diagnosis: Primary osteoarthritis Right knee    Plan: Right total knee arthroplasty    Due to the serious nature of total joint infection and the high prevalence of community acquired MRSA, vancomycin will be used perioperatively.

## 2024-12-16 NOTE — TELEPHONE ENCOUNTER
Met with patient during visit in clinic.  Discussed all pre/post op instructions - hibiclens given and use explained, NPO after midnight, expectations post op, bruising, swelling, ice, elevation, p/o d/c meds, DME, bowel mgmt, role of caregiver in patient's care, dressing over incision, PT, blue armband and purpose, when to call  //me/hotline, workflow for stay, my checkins, and [facility] paperwork.    Pt states she has not scheduled PT yet - says they haven't gotten orders.  These were emailed in October and faxed at end of Nov/first of Dec.   Emailed and faxed again now. Attempted to reach PT loc multiple times without success.  Left very detailed voicemail with direct call back number to speak with someone.    Pt states that she's changed PCP's because Glo Serrano, NP, is out of network.  Patient's new PCP is Dr. Joss Grant.  [phone]   Called and spoke with  - explained situation and asked to speak with provider staff to see if they would be willing to do post op since Glo is OON.   She took down information and stated she would have someone call me back.      Arrival time given to patient and  - 0730     [phone]

## 2024-12-17 ENCOUNTER — ANESTHESIA (OUTPATIENT)
Dept: SURGERY | Facility: HOSPITAL | Age: 49
DRG: 470 | End: 2024-12-17
Payer: COMMERCIAL

## 2024-12-17 ENCOUNTER — TELEPHONE (OUTPATIENT)
Dept: ORTHOPEDICS | Facility: CLINIC | Age: 49
End: 2024-12-17
Payer: COMMERCIAL

## 2024-12-17 ENCOUNTER — HOSPITAL ENCOUNTER (INPATIENT)
Facility: HOSPITAL | Age: 49
LOS: 1 days | Discharge: HOME | DRG: 470 | End: 2024-12-18
Attending: ORTHOPAEDIC SURGERY | Admitting: ORTHOPAEDIC SURGERY
Payer: COMMERCIAL

## 2024-12-17 DIAGNOSIS — M17.11 PRIMARY OSTEOARTHRITIS OF RIGHT KNEE: ICD-10-CM

## 2024-12-17 LAB
POCT GLUCOSE: 113 MG/DL (ref 70–110)
POCT GLUCOSE: 126 MG/DL (ref 70–110)
POCT GLUCOSE: 149 MG/DL (ref 70–110)

## 2024-12-17 PROCEDURE — 63600175 PHARM REV CODE 636 W HCPCS: Performed by: ANESTHESIOLOGY

## 2024-12-17 PROCEDURE — 63600175 PHARM REV CODE 636 W HCPCS: Performed by: NURSE ANESTHETIST, CERTIFIED REGISTERED

## 2024-12-17 PROCEDURE — 63600175 PHARM REV CODE 636 W HCPCS: Performed by: NURSE PRACTITIONER

## 2024-12-17 PROCEDURE — 94761 N-INVAS EAR/PLS OXIMETRY MLT: CPT

## 2024-12-17 PROCEDURE — 71000039 HC RECOVERY, EACH ADD'L HOUR: Performed by: ORTHOPAEDIC SURGERY

## 2024-12-17 PROCEDURE — 97165 OT EVAL LOW COMPLEX 30 MIN: CPT

## 2024-12-17 PROCEDURE — 36000713 HC OR TIME LEV V EA ADD 15 MIN: Performed by: ORTHOPAEDIC SURGERY

## 2024-12-17 PROCEDURE — 64447 NJX AA&/STRD FEMORAL NRV IMG: CPT | Performed by: ANESTHESIOLOGY

## 2024-12-17 PROCEDURE — 8E0Y0CZ ROBOTIC ASSISTED PROCEDURE OF LOWER EXTREMITY, OPEN APPROACH: ICD-10-PCS | Performed by: ORTHOPAEDIC SURGERY

## 2024-12-17 PROCEDURE — 25000003 PHARM REV CODE 250

## 2024-12-17 PROCEDURE — 97162 PT EVAL MOD COMPLEX 30 MIN: CPT

## 2024-12-17 PROCEDURE — 99900035 HC TECH TIME PER 15 MIN (STAT)

## 2024-12-17 PROCEDURE — 25000003 PHARM REV CODE 250: Performed by: ANESTHESIOLOGY

## 2024-12-17 PROCEDURE — 25000003 PHARM REV CODE 250: Performed by: NURSE PRACTITIONER

## 2024-12-17 PROCEDURE — 97116 GAIT TRAINING THERAPY: CPT

## 2024-12-17 PROCEDURE — 27201423 OPTIME MED/SURG SUP & DEVICES STERILE SUPPLY: Performed by: ORTHOPAEDIC SURGERY

## 2024-12-17 PROCEDURE — 0SRC0J9 REPLACEMENT OF RIGHT KNEE JOINT WITH SYNTHETIC SUBSTITUTE, CEMENTED, OPEN APPROACH: ICD-10-PCS | Performed by: ORTHOPAEDIC SURGERY

## 2024-12-17 PROCEDURE — 37000009 HC ANESTHESIA EA ADD 15 MINS: Performed by: ORTHOPAEDIC SURGERY

## 2024-12-17 PROCEDURE — C1776 JOINT DEVICE (IMPLANTABLE): HCPCS | Performed by: ORTHOPAEDIC SURGERY

## 2024-12-17 PROCEDURE — 25000003 PHARM REV CODE 250: Performed by: NURSE ANESTHETIST, CERTIFIED REGISTERED

## 2024-12-17 PROCEDURE — 11000001 HC ACUTE MED/SURG PRIVATE ROOM

## 2024-12-17 PROCEDURE — 37000008 HC ANESTHESIA 1ST 15 MINUTES: Performed by: ORTHOPAEDIC SURGERY

## 2024-12-17 PROCEDURE — 63600175 PHARM REV CODE 636 W HCPCS: Performed by: PHYSICIAN ASSISTANT

## 2024-12-17 PROCEDURE — 36000712 HC OR TIME LEV V 1ST 15 MIN: Performed by: ORTHOPAEDIC SURGERY

## 2024-12-17 PROCEDURE — C1713 ANCHOR/SCREW BN/BN,TIS/BN: HCPCS | Performed by: ORTHOPAEDIC SURGERY

## 2024-12-17 PROCEDURE — 97535 SELF CARE MNGMENT TRAINING: CPT

## 2024-12-17 PROCEDURE — 82962 GLUCOSE BLOOD TEST: CPT | Performed by: ORTHOPAEDIC SURGERY

## 2024-12-17 PROCEDURE — 71000033 HC RECOVERY, INTIAL HOUR: Performed by: ORTHOPAEDIC SURGERY

## 2024-12-17 DEVICE — PRIMARY TIBIAL BASEPLATE
Type: IMPLANTABLE DEVICE | Site: KNEE | Status: FUNCTIONAL
Brand: TRIATHLON

## 2024-12-17 DEVICE — PATELLA
Type: IMPLANTABLE DEVICE | Site: KNEE | Status: FUNCTIONAL
Brand: TRIATHLON

## 2024-12-17 DEVICE — SIMPLEX® HV WITH GENTAMICIN IS A FAST-SETTING ACRYLIC RESIN WITH ADDITION OF GENTAMICIN SULFATE FOR USE IN BONE SURGERY. MIXING THE TWO SEPARATE STERILE COMPONENTS PRODUCES A DUCTILE BONE CEMENT WHICH, AFTER HARDENING, FIXES THE IMPLANT AND TRANSFERS STRESSES PRODUCED DURING MOVEMENT EVENLY TO THE BONE. SIMPLEX® HV WITH GENTAMICINN CEMENT POWDER ALSO CONTAINS INSOLUBLE ZIRCONIUM DIOXIDE AS AN X-RAY CONTRAST MEDIUM. SIMPLEX® HV WITH GENTAMICIN DOES NOT EMIT A SIGNAL AND DOES NOT POSE A SAFETY RISK IN A MAGNETIC RESONANCE ENVIRONMENT.
Type: IMPLANTABLE DEVICE | Site: KNEE | Status: FUNCTIONAL
Brand: SIMPLEX HV WITH GENTAMICIN

## 2024-12-17 DEVICE — CRUCIATE RETAINING FEMORAL
Type: IMPLANTABLE DEVICE | Site: KNEE | Status: FUNCTIONAL
Brand: TRIATHLON

## 2024-12-17 DEVICE — TIBIAL BEARING INSERT
Type: IMPLANTABLE DEVICE | Site: KNEE | Status: FUNCTIONAL
Brand: TRIATHLON

## 2024-12-17 RX ORDER — NALOXONE HCL 0.4 MG/ML
0.02 VIAL (ML) INJECTION
Status: DISCONTINUED | OUTPATIENT
Start: 2024-12-17 | End: 2024-12-18 | Stop reason: HOSPADM

## 2024-12-17 RX ORDER — OXYCODONE HYDROCHLORIDE 5 MG/1
5 TABLET ORAL
Status: DISCONTINUED | OUTPATIENT
Start: 2024-12-17 | End: 2024-12-18 | Stop reason: HOSPADM

## 2024-12-17 RX ORDER — MUPIROCIN 20 MG/G
1 OINTMENT TOPICAL 2 TIMES DAILY
Status: DISCONTINUED | OUTPATIENT
Start: 2024-12-17 | End: 2024-12-18 | Stop reason: HOSPADM

## 2024-12-17 RX ORDER — SODIUM CHLORIDE 9 MG/ML
INJECTION, SOLUTION INTRAVENOUS CONTINUOUS
Status: DISCONTINUED | OUTPATIENT
Start: 2024-12-17 | End: 2024-12-17

## 2024-12-17 RX ORDER — IBUPROFEN 200 MG
16 TABLET ORAL
Status: DISCONTINUED | OUTPATIENT
Start: 2024-12-17 | End: 2024-12-18 | Stop reason: HOSPADM

## 2024-12-17 RX ORDER — PROCHLORPERAZINE EDISYLATE 5 MG/ML
5 INJECTION INTRAMUSCULAR; INTRAVENOUS EVERY 6 HOURS PRN
Status: DISCONTINUED | OUTPATIENT
Start: 2024-12-17 | End: 2024-12-18 | Stop reason: HOSPADM

## 2024-12-17 RX ORDER — DEXMEDETOMIDINE HYDROCHLORIDE 100 UG/ML
INJECTION, SOLUTION INTRAVENOUS
Status: DISCONTINUED | OUTPATIENT
Start: 2024-12-17 | End: 2024-12-17

## 2024-12-17 RX ORDER — TALC
6 POWDER (GRAM) TOPICAL NIGHTLY PRN
Status: DISCONTINUED | OUTPATIENT
Start: 2024-12-17 | End: 2024-12-17 | Stop reason: HOSPADM

## 2024-12-17 RX ORDER — ONDANSETRON HYDROCHLORIDE 2 MG/ML
INJECTION, SOLUTION INTRAVENOUS
Status: DISCONTINUED | OUTPATIENT
Start: 2024-12-17 | End: 2024-12-17

## 2024-12-17 RX ORDER — GLUCAGON 1 MG
1 KIT INJECTION
Status: DISCONTINUED | OUTPATIENT
Start: 2024-12-17 | End: 2024-12-18 | Stop reason: HOSPADM

## 2024-12-17 RX ORDER — SODIUM CHLORIDE 0.9 % (FLUSH) 0.9 %
3 SYRINGE (ML) INJECTION
Status: DISCONTINUED | OUTPATIENT
Start: 2024-12-17 | End: 2024-12-17 | Stop reason: HOSPADM

## 2024-12-17 RX ORDER — MORPHINE SULFATE 2 MG/ML
2 INJECTION, SOLUTION INTRAMUSCULAR; INTRAVENOUS
Status: DISCONTINUED | OUTPATIENT
Start: 2024-12-17 | End: 2024-12-18 | Stop reason: HOSPADM

## 2024-12-17 RX ORDER — ONDANSETRON HYDROCHLORIDE 2 MG/ML
4 INJECTION, SOLUTION INTRAVENOUS EVERY 8 HOURS PRN
Status: DISCONTINUED | OUTPATIENT
Start: 2024-12-17 | End: 2024-12-18 | Stop reason: HOSPADM

## 2024-12-17 RX ORDER — PREGABALIN 75 MG/1
75 CAPSULE ORAL
Status: COMPLETED | OUTPATIENT
Start: 2024-12-17 | End: 2024-12-17

## 2024-12-17 RX ORDER — IBUPROFEN 200 MG
24 TABLET ORAL
Status: DISCONTINUED | OUTPATIENT
Start: 2024-12-17 | End: 2024-12-18 | Stop reason: HOSPADM

## 2024-12-17 RX ORDER — DOCUSATE SODIUM 100 MG
400 CAPSULE ORAL
Status: DISCONTINUED | OUTPATIENT
Start: 2024-12-17 | End: 2024-12-18 | Stop reason: HOSPADM

## 2024-12-17 RX ORDER — CELECOXIB 200 MG/1
400 CAPSULE ORAL ONCE
Status: COMPLETED | OUTPATIENT
Start: 2024-12-17 | End: 2024-12-17

## 2024-12-17 RX ORDER — PREGABALIN 75 MG/1
75 CAPSULE ORAL NIGHTLY
Status: DISCONTINUED | OUTPATIENT
Start: 2024-12-17 | End: 2024-12-18 | Stop reason: HOSPADM

## 2024-12-17 RX ORDER — SODIUM CHLORIDE 0.9 % (FLUSH) 0.9 %
10 SYRINGE (ML) INJECTION
Status: DISCONTINUED | OUTPATIENT
Start: 2024-12-17 | End: 2024-12-17 | Stop reason: HOSPADM

## 2024-12-17 RX ORDER — FENTANYL CITRATE 50 UG/ML
100 INJECTION, SOLUTION INTRAMUSCULAR; INTRAVENOUS
Status: DISCONTINUED | OUTPATIENT
Start: 2024-12-17 | End: 2024-12-17 | Stop reason: HOSPADM

## 2024-12-17 RX ORDER — KETAMINE HCL IN 0.9 % NACL 50 MG/5 ML
SYRINGE (ML) INTRAVENOUS
Status: DISCONTINUED | OUTPATIENT
Start: 2024-12-17 | End: 2024-12-17

## 2024-12-17 RX ORDER — OXYCODONE HYDROCHLORIDE 5 MG/1
5 TABLET ORAL
Status: DISCONTINUED | OUTPATIENT
Start: 2024-12-17 | End: 2024-12-17 | Stop reason: HOSPADM

## 2024-12-17 RX ORDER — CEFAZOLIN 2 G/1
2 INJECTION, POWDER, FOR SOLUTION INTRAMUSCULAR; INTRAVENOUS
Status: COMPLETED | OUTPATIENT
Start: 2024-12-17 | End: 2024-12-17

## 2024-12-17 RX ORDER — PROPOFOL 10 MG/ML
VIAL (ML) INTRAVENOUS CONTINUOUS PRN
Status: DISCONTINUED | OUTPATIENT
Start: 2024-12-17 | End: 2024-12-17

## 2024-12-17 RX ORDER — METFORMIN HYDROCHLORIDE 500 MG/1
500 TABLET, EXTENDED RELEASE ORAL 2 TIMES DAILY WITH MEALS
Status: DISCONTINUED | OUTPATIENT
Start: 2024-12-17 | End: 2024-12-18 | Stop reason: HOSPADM

## 2024-12-17 RX ORDER — HALOPERIDOL 5 MG/ML
0.5 INJECTION INTRAMUSCULAR EVERY 10 MIN PRN
Status: DISCONTINUED | OUTPATIENT
Start: 2024-12-17 | End: 2024-12-17 | Stop reason: HOSPADM

## 2024-12-17 RX ORDER — ASPIRIN 81 MG/1
81 TABLET ORAL 2 TIMES DAILY
Status: DISCONTINUED | OUTPATIENT
Start: 2024-12-17 | End: 2024-12-18 | Stop reason: HOSPADM

## 2024-12-17 RX ORDER — ACETAMINOPHEN 500 MG
1000 TABLET ORAL EVERY 6 HOURS
Status: DISCONTINUED | OUTPATIENT
Start: 2024-12-17 | End: 2024-12-18 | Stop reason: HOSPADM

## 2024-12-17 RX ORDER — FENTANYL CITRATE 50 UG/ML
INJECTION, SOLUTION INTRAMUSCULAR; INTRAVENOUS
Status: DISCONTINUED | OUTPATIENT
Start: 2024-12-17 | End: 2024-12-17

## 2024-12-17 RX ORDER — FENTANYL CITRATE 50 UG/ML
25 INJECTION, SOLUTION INTRAMUSCULAR; INTRAVENOUS EVERY 5 MIN PRN
Status: DISCONTINUED | OUTPATIENT
Start: 2024-12-17 | End: 2024-12-17 | Stop reason: HOSPADM

## 2024-12-17 RX ORDER — LIDOCAINE HYDROCHLORIDE 20 MG/ML
INJECTION INTRAVENOUS
Status: DISCONTINUED | OUTPATIENT
Start: 2024-12-17 | End: 2024-12-17

## 2024-12-17 RX ORDER — ROPIVACAINE HYDROCHLORIDE 5 MG/ML
INJECTION, SOLUTION EPIDURAL; INFILTRATION; PERINEURAL
Status: COMPLETED | OUTPATIENT
Start: 2024-12-17 | End: 2024-12-17

## 2024-12-17 RX ORDER — OXYCODONE HYDROCHLORIDE 5 MG/1
10 TABLET ORAL
Status: DISCONTINUED | OUTPATIENT
Start: 2024-12-17 | End: 2024-12-18 | Stop reason: HOSPADM

## 2024-12-17 RX ORDER — POLYETHYLENE GLYCOL 3350 17 G/17G
17 POWDER, FOR SOLUTION ORAL DAILY
Status: DISCONTINUED | OUTPATIENT
Start: 2024-12-17 | End: 2024-12-18 | Stop reason: HOSPADM

## 2024-12-17 RX ORDER — LIDOCAINE HYDROCHLORIDE 10 MG/ML
1 INJECTION, SOLUTION EPIDURAL; INFILTRATION; INTRACAUDAL; PERINEURAL
Status: DISCONTINUED | OUTPATIENT
Start: 2024-12-17 | End: 2024-12-17 | Stop reason: HOSPADM

## 2024-12-17 RX ORDER — ACETAMINOPHEN 500 MG
1000 TABLET ORAL
Status: COMPLETED | OUTPATIENT
Start: 2024-12-17 | End: 2024-12-17

## 2024-12-17 RX ORDER — ATORVASTATIN CALCIUM 40 MG/1
40 TABLET, FILM COATED ORAL NIGHTLY
Status: DISCONTINUED | OUTPATIENT
Start: 2024-12-17 | End: 2024-12-18 | Stop reason: HOSPADM

## 2024-12-17 RX ORDER — BISACODYL 10 MG/1
10 SUPPOSITORY RECTAL EVERY 12 HOURS PRN
Status: DISCONTINUED | OUTPATIENT
Start: 2024-12-17 | End: 2024-12-18 | Stop reason: HOSPADM

## 2024-12-17 RX ORDER — DEXAMETHASONE SODIUM PHOSPHATE 4 MG/ML
INJECTION, SOLUTION INTRA-ARTICULAR; INTRALESIONAL; INTRAMUSCULAR; INTRAVENOUS; SOFT TISSUE
Status: DISCONTINUED | OUTPATIENT
Start: 2024-12-17 | End: 2024-12-17

## 2024-12-17 RX ORDER — MUPIROCIN 20 MG/G
1 OINTMENT TOPICAL
Status: COMPLETED | OUTPATIENT
Start: 2024-12-17 | End: 2024-12-17

## 2024-12-17 RX ORDER — CEFAZOLIN 2 G/1
2 INJECTION, POWDER, FOR SOLUTION INTRAMUSCULAR; INTRAVENOUS
Status: COMPLETED | OUTPATIENT
Start: 2024-12-17 | End: 2024-12-18

## 2024-12-17 RX ORDER — PROPOFOL 10 MG/ML
VIAL (ML) INTRAVENOUS
Status: DISCONTINUED | OUTPATIENT
Start: 2024-12-17 | End: 2024-12-17

## 2024-12-17 RX ORDER — METHOCARBAMOL 500 MG/1
1000 TABLET, FILM COATED ORAL ONCE
Status: COMPLETED | OUTPATIENT
Start: 2024-12-17 | End: 2024-12-17

## 2024-12-17 RX ORDER — INSULIN ASPART 100 [IU]/ML
0-10 INJECTION, SOLUTION INTRAVENOUS; SUBCUTANEOUS
Status: DISCONTINUED | OUTPATIENT
Start: 2024-12-17 | End: 2024-12-18 | Stop reason: HOSPADM

## 2024-12-17 RX ORDER — SODIUM CHLORIDE 9 MG/ML
INJECTION, SOLUTION INTRAVENOUS
Status: DISCONTINUED | OUTPATIENT
Start: 2024-12-17 | End: 2024-12-17 | Stop reason: HOSPADM

## 2024-12-17 RX ORDER — ROPIVACAINE HYDROCHLORIDE 2 MG/ML
INJECTION, SOLUTION EPIDURAL; INFILTRATION; PERINEURAL CONTINUOUS
Status: DISCONTINUED | OUTPATIENT
Start: 2024-12-17 | End: 2024-12-18 | Stop reason: HOSPADM

## 2024-12-17 RX ORDER — AMOXICILLIN 250 MG
1 CAPSULE ORAL 2 TIMES DAILY
Status: DISCONTINUED | OUTPATIENT
Start: 2024-12-17 | End: 2024-12-18 | Stop reason: HOSPADM

## 2024-12-17 RX ORDER — MIDAZOLAM HYDROCHLORIDE 1 MG/ML
4 INJECTION, SOLUTION INTRAMUSCULAR; INTRAVENOUS
Status: DISCONTINUED | OUTPATIENT
Start: 2024-12-17 | End: 2024-12-17 | Stop reason: HOSPADM

## 2024-12-17 RX ORDER — FAMOTIDINE 20 MG/1
20 TABLET, FILM COATED ORAL 2 TIMES DAILY
Status: DISCONTINUED | OUTPATIENT
Start: 2024-12-17 | End: 2024-12-18 | Stop reason: HOSPADM

## 2024-12-17 RX ORDER — METHOCARBAMOL 750 MG/1
750 TABLET, FILM COATED ORAL 3 TIMES DAILY
Status: DISCONTINUED | OUTPATIENT
Start: 2024-12-17 | End: 2024-12-18 | Stop reason: HOSPADM

## 2024-12-17 RX ORDER — TRANEXAMIC ACID 100 MG/ML
INJECTION, SOLUTION INTRAVENOUS
Status: DISCONTINUED | OUTPATIENT
Start: 2024-12-17 | End: 2024-12-17

## 2024-12-17 RX ADMIN — INSULIN ASPART 4 UNITS: 100 INJECTION, SOLUTION INTRAVENOUS; SUBCUTANEOUS at 05:12

## 2024-12-17 RX ADMIN — MIDAZOLAM 2 MG: 1 INJECTION INTRAMUSCULAR; INTRAVENOUS at 09:12

## 2024-12-17 RX ADMIN — TRANEXAMIC ACID 1000 MG: 100 INJECTION INTRAVENOUS at 10:12

## 2024-12-17 RX ADMIN — MEPIVACAINE HYDROCHLORIDE 3.5 ML: 15 INJECTION, SOLUTION EPIDURAL; INFILTRATION at 10:12

## 2024-12-17 RX ADMIN — DEXMEDETOMIDINE 8 MCG: 100 INJECTION, SOLUTION, CONCENTRATE INTRAVENOUS at 12:12

## 2024-12-17 RX ADMIN — VANCOMYCIN HYDROCHLORIDE 1500 MG: 1.5 INJECTION, POWDER, LYOPHILIZED, FOR SOLUTION INTRAVENOUS at 08:12

## 2024-12-17 RX ADMIN — ONDANSETRON 4 MG: 2 INJECTION INTRAMUSCULAR; INTRAVENOUS at 10:12

## 2024-12-17 RX ADMIN — PREGABALIN 75 MG: 75 CAPSULE ORAL at 08:12

## 2024-12-17 RX ADMIN — METHOCARBAMOL 1000 MG: 500 TABLET ORAL at 12:12

## 2024-12-17 RX ADMIN — ROPIVACAINE HYDROCHLORIDE 8 ML: 5 INJECTION EPIDURAL; INFILTRATION; PERINEURAL at 09:12

## 2024-12-17 RX ADMIN — Medication 400 ML: at 04:12

## 2024-12-17 RX ADMIN — METFORMIN HYDROCHLORIDE 500 MG: 500 TABLET, EXTENDED RELEASE ORAL at 05:12

## 2024-12-17 RX ADMIN — ACETAMINOPHEN 1000 MG: 500 TABLET ORAL at 11:12

## 2024-12-17 RX ADMIN — CELECOXIB 400 MG: 200 CAPSULE ORAL at 08:12

## 2024-12-17 RX ADMIN — CEFAZOLIN 2 G: 2 INJECTION, POWDER, FOR SOLUTION INTRAMUSCULAR; INTRAVENOUS at 05:12

## 2024-12-17 RX ADMIN — CEFAZOLIN 2 G: 2 INJECTION, POWDER, FOR SOLUTION INTRAMUSCULAR; INTRAVENOUS at 10:12

## 2024-12-17 RX ADMIN — OXYCODONE 5 MG: 5 TABLET ORAL at 11:12

## 2024-12-17 RX ADMIN — LIDOCAINE HYDROCHLORIDE 40 MG: 20 INJECTION INTRAVENOUS at 10:12

## 2024-12-17 RX ADMIN — Medication 30 MG: at 10:12

## 2024-12-17 RX ADMIN — SENNOSIDES AND DOCUSATE SODIUM 1 TABLET: 50; 8.6 TABLET ORAL at 08:12

## 2024-12-17 RX ADMIN — METHOCARBAMOL 750 MG: 750 TABLET ORAL at 08:12

## 2024-12-17 RX ADMIN — GLYCOPYRROLATE 0.2 MG: 0.2 INJECTION, SOLUTION INTRAMUSCULAR; INTRAVENOUS at 10:12

## 2024-12-17 RX ADMIN — ATORVASTATIN CALCIUM 40 MG: 40 TABLET, FILM COATED ORAL at 08:12

## 2024-12-17 RX ADMIN — PROPOFOL 100 MCG/KG/MIN: 10 INJECTION, EMULSION INTRAVENOUS at 10:12

## 2024-12-17 RX ADMIN — PROPOFOL 50 MG: 10 INJECTION, EMULSION INTRAVENOUS at 10:12

## 2024-12-17 RX ADMIN — TRANEXAMIC ACID 1000 MG: 100 INJECTION INTRAVENOUS at 11:12

## 2024-12-17 RX ADMIN — PROCHLORPERAZINE EDISYLATE 5 MG: 5 INJECTION INTRAMUSCULAR; INTRAVENOUS at 02:12

## 2024-12-17 RX ADMIN — SODIUM CHLORIDE: 0.9 INJECTION, SOLUTION INTRAVENOUS at 09:12

## 2024-12-17 RX ADMIN — ACETAMINOPHEN 1000 MG: 500 TABLET ORAL at 09:12

## 2024-12-17 RX ADMIN — ASPIRIN 81 MG: 81 TABLET, COATED ORAL at 08:12

## 2024-12-17 RX ADMIN — OXYCODONE 5 MG: 5 TABLET ORAL at 12:12

## 2024-12-17 RX ADMIN — MUPIROCIN 1 G: 20 OINTMENT TOPICAL at 08:12

## 2024-12-17 RX ADMIN — FAMOTIDINE 20 MG: 20 TABLET ORAL at 08:12

## 2024-12-17 RX ADMIN — Medication: at 12:12

## 2024-12-17 RX ADMIN — DEXAMETHASONE SODIUM PHOSPHATE 8 MG: 4 INJECTION, SOLUTION INTRAMUSCULAR; INTRAVENOUS at 10:12

## 2024-12-17 RX ADMIN — FENTANYL CITRATE 50 MCG: 50 INJECTION INTRAMUSCULAR; INTRAVENOUS at 09:12

## 2024-12-17 RX ADMIN — Medication 400 ML: at 08:12

## 2024-12-17 RX ADMIN — ACETAMINOPHEN 1000 MG: 500 TABLET ORAL at 05:12

## 2024-12-17 RX ADMIN — SODIUM CHLORIDE, SODIUM GLUCONATE, SODIUM ACETATE, POTASSIUM CHLORIDE, MAGNESIUM CHLORIDE, SODIUM PHOSPHATE, DIBASIC, AND POTASSIUM PHOSPHATE: .53; .5; .37; .037; .03; .012; .00082 INJECTION, SOLUTION INTRAVENOUS at 11:12

## 2024-12-17 RX ADMIN — FENTANYL CITRATE 25 MCG: 50 INJECTION, SOLUTION INTRAMUSCULAR; INTRAVENOUS at 10:12

## 2024-12-17 NOTE — OP NOTE
DATE OF PROCEDURE:  12/17/24.     PREOPERATIVE DIAGNOSIS:  Arthritis, right knee.     POSTOPERATIVE DIAGNOSIS:  Arthritis, right knee.     PROCEDURES PERFORMED:  Robotically-assisted right total knee arthroplasty.     SURGEON:  Bull Donahue M.D.     ASSISTANT:  HERO Hill     ANESTHESIA:  Regional.     COMPLICATIONS:  None.     COUNTS:  Correct.     DISPOSITION:  Recovery Room, stable.     SPECIMENS:  Bone and cartilage.     FINDINGS:  Arthritis.     FLUIDS:  2000 mL.     ESTIMATED BLOOD LOSS:  <50cc     IMPLANTS:  [place] Triathlon size 4 right  Triathlon cruciate retaining femoral component and a size 3 primary tibial baseplate, 29 mm patella and a size 3, 9 mm   CS tibial insert.     INDICATIONS FOR PROCEDURE:   Faith Bolton  is a 49-year-old female who is   having symptoms of right knee pain.  Physical examination and imaging studies   were consistent with arthritis.Treatment options were explained and it was decided   to proceed with robotically-assisted right total knee arthroplasty.  She was   aware of reasonable treatment options as well as risks and benefits.       PROCEDURE IN DETAIL:  After appropriate consent was obtained, the patient   brought in the Operating Room, anesthesia was administered.  She received   antibiotic prophylaxis.  Cast padding and tourniquet was applied to the proximal  right thigh.  right lower extremity was then prepped and draped in usual sterile   fashion.  The leg lopes was applied.  Timeout was called.  Limb was elevated   and tourniquet was inflated.  The knee was flexed.  An incision was made from   the tibial tubercle just proximal to the superior pole of the patella.  It was   taken down through the skin and retinacular.  A medial parapatellar arthrotomy   was performed followed by a medial release.  Following this ACL was resected, fat pad   was excised.  The patella was everted.  It was measured and found to be 23 mm,  8 mm of bone removed and the 3 peg  holes were drilled for the 28 mm patella.    Following this, 2 stab incisions were made 4 fingerbreadths below the tibial   tubercle on the medial aspect of the tibial crest.  The 2 guide pins were placed   along with the fixation device through these.  The array was applied and   tightened to the clamp. We checked the security of the array and it was fine. Following this, we placed the femoral pins 1 cm superior and anterior to the MCL insertion.  The check point was placed in between the pins. The guide clamp and array were secured..  Once this was accomplished, the hip center was obtained, the   medial and lateral malleoli were demarcated.  The femoral check point and tibial   check point were placed and the femur and tibia were then registered.    Acceptable registration was obtained.  Osteophytes were removed. We then captured poses in extension,   And approximately 90 degrees of flexion.  Initial alignment was 1.5 degrees valgus and 4  Degrees extension .  The  initial gaps were   then obtained. The extension gaps were 15.5 medially and 16 laterally.  The flexion gaps were 15.5 medially and 16 laterally.  Component   position was adjusted.  We were very satisfied with the component position and   sizing.  We had a size 4 femur and a 3 tibia.  Once this was accomplished, the   robotic arm was brought in and our cuts were made.  The tibia was cut 1st.  We then cut the anterior femur anterior chamfer and posterior femur.  The saw blade was then switched and we made our distal and posterior chamfer cut.  We were very satisfied all the cuts.  Bone fragments were removed.  Lamina  was used to open up posteriorly and we removed any remaining osteophytes and meniscal remnants. The PCL was intact and   robust.  We placed the tibial trial.  We had good coverage with this.  We used   the medial one-third of the tibial tubercle to guide rotation and the trial was pinned in   place.  A 9 mm insert was placed and  then the femoral component was placed.    This was centered on the femur and the knee was brought through a range of   motion.  She was very well balanced in flexion with 18.5 mm gaps medially and   laterally.  In extension, there was an 18 mm gap medially 18.5 mm gap laterally.    Clinically,there was excellent balance and stability.  Final alignment was 4 degrees flexion and 1 degrees valgus. Trial   patellar button was placed.  Patella tracked well.  Therefore, at this point, we   were satisfied with knee range of motion and stability, component position,   sizing and alignment as well as patella tracking.  It should be noted that she  had full extension and he had at least 130 degrees of flexion and there was no   instability at full extension, midflexion, or deep flexion.  Trial components   were removed.  Arrays and femoral and tibial pins were removed. The bone was then prepared for cementing for pulsatile lavage and   drying. Components were then cemented into   place. Tibia followed by femur.  Cement was applied to the tibial keel as   well.  Excess cement was removed.  The tibial insert was firmly seated.  The knee was inspected.  There was no loose body, foreign body, or soft tissue interposition.  It was   then reduced.  Patella button was cemented in place.  Once the cement was dry,   the knee was irrigated with Betadine solution.  Following this, it was irrigated   with pulsatile lavage.  This was periodically done throughout the closure.  The   incision was then closed.  The arthrotomy was closed with #1 Vicryl.  Once the   arthrotomy was closed, the knee was brought through range of motion and was   stable as previously described and the patella tracked well.  The remainder of   the incision was closed with 0 Vicryl, 2-0 Vicryl, Monocryl, and Dermabond.    The stab incisions were closed with Vicryl and Dermabond.  It should be noted   that all check points were removed as well as the femoral and  tibial pins.    Sterile dressing was applied.  She was transferred from the Operating Room table   to a stretcher, brought to Recovery Room in stable condition.  She tolerated the   procedure well and there were no known complications. A gram of IV tranexamic acid was received prior to incision and at closure.

## 2024-12-17 NOTE — PLAN OF CARE
Problem: Occupational Therapy  Goal: Occupational Therapy Goal  Description: Goals to be met by: 12/18/2024     Patient will increase functional independence with ADLs by performing:    UE Dressing with Supervision.  LE Dressing with Supervision.  Grooming while standing at sink with Supervision.  Toileting from toilet with Supervision for hygiene and clothing management.   Toilet transfer to toilet with Supervision.    Outcome: Progressing

## 2024-12-17 NOTE — ASSESSMENT & PLAN NOTE
Faith Bolton is a 49 y.o. female sp R TKA on 12/17/24. Doing well this morning.    Plan:  Pain control: multimodal  PT/OT: WBAT RLE  DVT PPx: ASA 81 mg BID, SCDs at all times when not ambulating  Abx: postop Ancef    Dispo: discharge to [facility] after therapy

## 2024-12-17 NOTE — PLAN OF CARE
Pre op checklist complete. Pt resting in bed with call light in reach. All questions answered. Pt belongings at bedside and plan to leave with pt daughter. Pt daughter at bedside.  used. Pt still needs anes consent/update/site ashish

## 2024-12-17 NOTE — TELEPHONE ENCOUNTER
Have not heard back from PT location - called again now - no answer.  Sent electronic message as well.

## 2024-12-17 NOTE — INTERVAL H&P NOTE
Faith Abraham Bolton was interviewed, examined and the H and P reviewed.  There has been no interval change in her History and Physical.

## 2024-12-17 NOTE — NURSING
Patient arrived to unit [facility]Ox[facility], In hospital bed from PACU. VSS and IVF infusing. Dressing to right knee, CDI. See assessment. Patient oriented to room. Bed in lowest position, side rails up x2, call light within reach, patient instructed to call for assistance, verbalized understanding. Will continue to monitor.     Nurses Note -- 4 Eyes      12/17/2024   1:50 PM      Skin assessed during: Admit      [x] No Altered Skin Integrity Present    []Prevention Measures Documented      [] Yes- Altered Skin Integrity Present or Discovered   [] LDA Added if Not in Epic (Describe Wound)   [] New Altered Skin Integrity was Present on Admit and Documented in LDA   [] Wound Image Taken    Wound Care Consulted? No    Attending Nurse:  JAKI Isabel    Second RN/Staff Member:   JAKI Saenz

## 2024-12-17 NOTE — ANESTHESIA POSTPROCEDURE EVALUATION
Anesthesia Post Evaluation    Patient: Faith Bolton    Procedure(s) Performed: Procedure(s) (LRB):  ARTHROPLASTY, KNEE, TOTAL, USING COMPUTER-ASSISTED NAVIGATION - right - STERLING (Right)    Final Anesthesia Type: spinal      Patient location during evaluation: PACU  Patient participation: Yes- Able to Participate  Level of consciousness: awake and alert  Post-procedure vital signs: reviewed and stable  Pain management: adequate  Airway patency: patent  JOSE E mitigation strategies: Multimodal analgesia  PONV status at discharge: No PONV  Anesthetic complications: no      Cardiovascular status: blood pressure returned to baseline  Respiratory status: unassisted  Hydration status: euvolemic  Follow-up not needed.              Vitals Value Taken Time   /76 12/17/24 1347   Temp 36.7 °C (98 °F) 12/17/24 1347   Pulse 50 12/17/24 1347   Resp 18 12/17/24 1347   SpO2 99 % 12/17/24 1347         Event Time   Out of Recovery 13:33:42         Pain/Pavel Score: Pain Rating Prior to Med Admin: 0 (12/17/2024 12:38 PM)  Pain Rating Post Med Admin: 5 (12/17/2024  9:39 AM)  Pavel Score: 10 (12/17/2024  1:15 PM)

## 2024-12-17 NOTE — PLAN OF CARE
Problem: Diabetes Comorbidity  Goal: Blood Glucose Level Within Targeted Range  Intervention: Monitor and Manage Glycemia  Flowsheets (Taken 12/17/2024 1430)  Glycemic Management: blood glucose monitored     Problem: Pain Acute  Goal: Optimal Pain Control and Function  Intervention: Develop Pain Management Plan  Flowsheets (Taken 12/17/2024 1430)  Pain Management Interventions:   care clustered   cold applied   pain management plan reviewed with patient/caregiver   pain pump in use     Problem: Pain Acute  Goal: Optimal Pain Control and Function  Intervention: Prevent or Manage Pain  Flowsheets (Taken 12/17/2024 1430)  Sensory Stimulation Regulation:   care clustered   lighting decreased   quiet environment promoted  Medication Review/Management: medications reviewed     Problem: Pain Acute  Goal: Optimal Pain Control and Function  Intervention: Optimize Psychosocial Wellbeing  Flowsheets (Taken 12/17/2024 1430)  Supportive Measures:   active listening utilized   positive reinforcement provided     Problem: Wound  Goal: Absence of Infection Signs and Symptoms  Intervention: Prevent or Manage Infection  Flowsheets (Taken 12/17/2024 1430)  Infection Management: aseptic technique maintained     Problem: Wound  Goal: Optimal Pain Control and Function  Intervention: Prevent or Manage Pain  Flowsheets (Taken 12/17/2024 1430)  Pain Management Interventions:   care clustered   cold applied   pain management plan reviewed with patient/caregiver   pain pump in use     Problem: Knee Arthroplasty  Goal: Optimal Coping  Intervention: Support Psychosocial Response to Surgery and Mobility Changes  Flowsheets (Taken 12/17/2024 1430)  Supportive Measures:   active listening utilized   positive reinforcement provided     Problem: Knee Arthroplasty  Goal: Absence of Bleeding  Intervention: Monitor and Manage Bleeding  Flowsheets (Taken 12/17/2024 1430)  Bleeding Management: dressing monitored     Problem: Knee Arthroplasty  Goal:  Absence of Infection Signs and Symptoms  Intervention: Prevent or Manage Infection  Flowsheets (Taken 12/17/2024 1430)  Infection Management: aseptic technique maintained     Problem: Knee Arthroplasty  Goal: Optimal Pain Control and Function  Intervention: Prevent or Manage Pain  Flowsheets (Taken 12/17/2024 1430)  Pain Management Interventions:   care clustered   cold applied   pain management plan reviewed with patient/caregiver   pain pump in use     Problem: Fall Injury Risk  Goal: Absence of Fall and Fall-Related Injury  Intervention: Identify and Manage Contributors  Flowsheets (Taken 12/17/2024 1430)  Medication Review/Management: medications reviewed     Problem: Fall Injury Risk  Goal: Absence of Fall and Fall-Related Injury  Intervention: Promote Injury-Free Environment  Flowsheets (Taken 12/17/2024 1430)  Safety Promotion/Fall Prevention:   assistive device/personal item within reach   Fall Risk reviewed with patient/family   instructed to call staff for mobility   lighting adjusted   medications reviewed   side rails raised x 2     Plan of care reviewed with pt, verbalized understanding. Medications and possible side effects reviewed and administered as ordered.  Purposeful rounding completed.  Safety precautions maintained.  Call light placed within reach.

## 2024-12-17 NOTE — NURSING TRANSFER
Nursing Transfer Note      12/17/2024   1:33 PM    Nurse giving handoff:atif  Nurse receiving handoff:brandon    Reason patient is being transferred: extended recovery    Transfer To: 315    Transfer via bed    Transported by RNx2    Medicines sent: n/a    Any special needs or follow-up needed: n/a    Patient belongings transferred with patient: Yes    Chart send with patient: Yes    Upon arrival to floor: patient oriented to room, call bell in reach, and bed in lowest position

## 2024-12-17 NOTE — PT/OT/SLP EVAL
Occupational Therapy   Evaluation    Name: Faith Bolton  MRN: [id number]  Admitting Diagnosis: Primary osteoarthritis of right knee  Recent Surgery: Procedure(s) (LRB):  ARTHROPLASTY, KNEE, TOTAL, USING COMPUTER-ASSISTED NAVIGATION - right - STERLING (Right) Day of Surgery    Recommendations:     Discharge Recommendations: Low Intensity Therapy  Discharge Equipment Recommendations:  walker, rolling, bath bench, bedside commode  Barriers to discharge:  None    DME Justifications     Bedside Commode- Patient has a mobility limitation that significantly impairs their ability to participate in one or more mobility related activities of daily living, including toileting. This deficit can be resolved by using a bedside commode. Patient demonstrates mobility limitations that will cause them to be confined to one room at home without bathroom access for up to 30 days. Using a bedside commode will greatly improve the patient's ability to participate in MRADLs.     Rolling Walker- Patient demonstrates a mobility limitation that significantly impairs their ability to participate in one or more mobility related activities of daily living. Patient's mobility limitation cannot be sufficiently resolved with the use of a cane, but can be sufficiently resolved with the use of a rolling walker.The use of a rolling walker will considerably improve their ability to participate in MRADLs. Patient will use the walker on a regular basis at home.        Assessment:     Faith Bolton is a 49 y.o. female with a medical diagnosis of Primary osteoarthritis of right knee.  She presents with the following performance deficits affecting function: impaired endurance, impaired self care skills, impaired functional mobility, gait instability, impaired balance, pain, decreased lower extremity function. Pt was willing to participate, tolerated session well overall. She was able to ambulate from chair to bathroom to perform toileting task  "before returning to chair. She demonstrated fair functional endurance and activity tolerance with good walker management.    Rehab Prognosis: Good; patient would benefit from acute skilled OT services to address these deficits and reach maximum level of function.       Plan:     Patient to be seen daily to address the above listed problems via self-care/home management, therapeutic activities, therapeutic exercises  Plan of Care Expires: 12/18/24  Plan of Care Reviewed with: patient, daughter    Subjective     Chief Complaint: none stated  Patient/Family Comments/goals: "I need to use the restroom."    Occupational Profile:  Living Environment: trailer c/ 5 CEE no rail; t/s, standard toilet; lives c/   Previous level of function: indep  Roles and Routines: wife, mother  Equipment Used at Home: none  Assistance upon Discharge: per daughter at bedside, pt will be staying with daughter upon d/c; SSH, level entrance, t/s, standard toilet    Pain/Comfort:  Pain Rating 1: 0/10  Pain Rating Post-Intervention 1: 0/10    Patients cultural, spiritual, Nondenominational conflicts given the current situation: no    Objective:     Communicated with: RN prior to session.  Patient found up in chair with cryotherapy, FCD upon OT entry to room.    General Precautions: Standard, fall  Orthopedic Precautions: RLE weight bearing as tolerated  Braces: N/A  Respiratory Status: Room air    Occupational Performance:    Bed Mobility:    Pt started and finished session in chair    Functional Mobility/Transfers:  Patient completed Sit <> Stand Transfer with contact guard assistance  with  rolling walker   Patient completed Toilet Transfer Step Transfer technique with contact guard assistance with  rolling walker  Chair t/f: CGA c/ RW  Functional Mobility: from chair to bathroom back to chair; CGA c/ RW; no LOB  Min vc's for sequencing c/ t/f's    Activities of Daily Living:  Grooming: contact guard assistance pt washed hands standing at " sink  Toileting: contact guard assistance performed on BSC over toilet; pt able to manage gown, perform perineal care; CGA for balance    Cognitive/Visual Perceptual:  Cognitive/Psychosocial Skills:     -       Oriented to: Person, Place, Time, and Situation   -       Follows Commands/attention:Follows multistep  commands  -       Safety awareness/insight to disability: intact     Physical Exam:  Balance:    -       dynamic standing balance: CGA  Upper Extremity Range of Motion:     -       Right Upper Extremity: WFL  -       Left Upper Extremity: WFL  Upper Extremity Strength:    -       Right Upper Extremity: WFL  -       Left Upper Extremity: WFL   Strength:    -       Right Upper Extremity: WFL  -       Left Upper Extremity: WFL    AMPAC 6 Click ADL:  [facility] Total Score: 21    Treatment & Education:  Pt edu on role of OT, POC, safety when performing self care tasks , benefit of performing OOB activity, and safety when performing functional transfers and mobility.  - White board updated  - Self care tasks completed-- as noted above    - pt educated on WB status    Patient left up in chair with all lines intact, call button in reach, RN notified, and daughter present    Pt Bulgarian speaking, daughter translated during session.    GOALS:   Multidisciplinary Problems       Occupational Therapy Goals          Problem: Occupational Therapy    Goal Priority Disciplines Outcome Interventions   Occupational Therapy Goal     OT, PT/OT Progressing    Description: Goals to be met by: 12/18/2024     Patient will increase functional independence with ADLs by performing:    UE Dressing with Supervision.  LE Dressing with Supervision.  Grooming while standing at sink with Supervision.  Toileting from toilet with Supervision for hygiene and clothing management.   Toilet transfer to toilet with Supervision.                         History:     Past Medical History:   Diagnosis Date    Adenomyosis     Anemia, unspecified      Mixed hyperlipidemia     Osteoarthritis of wrist     bilateral    Primary osteoarthritis of left knee     Primary osteoarthritis of right knee     Type 2 diabetes mellitus without complications     Uterine leiomyoma          Past Surgical History:   Procedure Laterality Date    BILATERAL TUBAL LIGATION      LAPAROSCOPY-ASSISTED VAGINAL HYSTERECTOMY         Time Tracking:     OT Date of Treatment: 12/17/24  OT Start Time: 1601  OT Stop Time: 1618  OT Total Time (min): 17 min    Billable Minutes:Evaluation 8  Self Care/Home Management 9    12/17/2024

## 2024-12-17 NOTE — ANESTHESIA PROCEDURE NOTES
Spinal    Diagnosis: right knee pain  Patient location during procedure: OR  Start time: 12/17/2024 10:07 AM  Timeout: 12/17/2024 10:05 AM  End time: 12/17/2024 10:09 AM    Staffing  Authorizing Provider: Lauren Brito MD  Performing Provider: Lauren Brito MD    Staffing  Performed by: Lauren Brito MD  Authorized by: Lauren Brito MD    Preanesthetic Checklist  Completed: patient identified, IV checked, site marked, risks and benefits discussed, surgical consent, monitors and equipment checked, pre-op evaluation and timeout performed  Spinal Block  Patient position: sitting  Prep: ChloraPrep  Patient monitoring: heart rate, cardiac monitor, continuous pulse ox, continuous capnometry and frequent blood pressure checks  Approach: midline  Location: L3-4  Injection technique: single shot  CSF Fluid: clear free-flowing CSF  Needle  Needle type: Nabila   Needle gauge: 25 G  Needle length: 3.5 in  Needle localization: anatomical landmarks  Assessment  Sensory level: T8   Dermatomal levels determined by pinch or prick  Ease of block: easy  Patient's tolerance of the procedure: comfortable throughout block  Medications:    Medications: mepivacaine (CARBOCAINE) injection 15 mg/mL (1.5%) - Other   3.5 mL - 12/17/2024 10:09:00 AM

## 2024-12-17 NOTE — TELEPHONE ENCOUNTER
Faxes to PCP failed.   Contacted office - given a different fax# [phone].  Resent to new number.     Called alternate number for PT location - no answer; left voicemail requesting call back

## 2024-12-17 NOTE — ANESTHESIA PROCEDURE NOTES
Adductor Canal Catheter    Patient location during procedure: pre-op   Block not for primary anesthetic.  Reason for block: at surgeon's request and post-op pain management   Post-op Pain Location: right knee pain   Start time: 12/17/2024 9:42 AM  Timeout: 12/17/2024 9:39 AM   End time: 12/17/2024 9:50 AM    Staffing  Authorizing Provider: Lauren Brito MD  Performing Provider: Lauren Brito MD    Staffing  Performed by: Lauren Brito MD  Authorized by: Lauren Brito MD    Preanesthetic Checklist  Completed: patient identified, IV checked, site marked, risks and benefits discussed, surgical consent, monitors and equipment checked, pre-op evaluation and timeout performed  Peripheral Block  Patient position: supine  Prep: ChloraPrep and site prepped and draped  Patient monitoring: heart rate, cardiac monitor, continuous pulse ox, continuous capnometry and frequent blood pressure checks  Block type: adductor canal  Laterality: right  Injection technique: single shot  Needle  Needle type: Stimuplex   Needle gauge: 20 G  Needle length: 4 in  Needle localization: anatomical landmarks and ultrasound guidance  Catheter type: spring wound  Catheter size: 19 G  Test dose: lidocaine 1.5% with Epi 1-to-200,000 and negative   -ultrasound image captured on disc.  Assessment  Injection assessment: negative aspiration, negative parasthesia and local visualized surrounding nerve  Paresthesia pain: none  Heart rate change: no  Slow fractionated injection: yes    Medications:    Medications: ropivacaine (NAROPIN) injection 0.5% - Perineural   8 mL - 12/17/2024 9:45:00 AM    Additional Notes  VSS.  [facility] RN monitoring vitals throughout procedure.  Patient tolerated procedure well.     15 cc of 0.25% ropivacaine with 1/300k epi used as local

## 2024-12-17 NOTE — TELEPHONE ENCOUNTER
Incoming call from clinical assistant to Dr. Grant.  States patient discussed postop care during visit in their office to establish care and she's fairly certain the provider will do postop care.  P/o agmt faxed to [phone] at this time for signature.   Also sent pt's historical records.

## 2024-12-17 NOTE — ANESTHESIA PREPROCEDURE EVALUATION
"                                                                                                             12/17/2024  Faith Bolton is a 49 y.o., female.    Pre-operative evaluation for Procedure(s) (LRB):  ARTHROPLASTY, KNEE, TOTAL, USING COMPUTER-ASSISTED NAVIGATION - right - STERLING (Right)    Faith Bolton is a 49 y.o. female     Patient Active Problem List   Diagnosis    Mixed hyperlipidemia    Primary osteoarthritis of right knee    Type 2 diabetes mellitus without complications    Elevated LFTs    Acid reflux    Asymptomatic varicose veins of both lower extremities       Review of patient's allergies indicates:  No Known Allergies    No current facility-administered medications on file prior to encounter.     Current Outpatient Medications on File Prior to Encounter   Medication Sig Dispense Refill    metFORMIN (GLUCOPHAGE-XR) 500 MG ER 24hr tablet Take 500 mg by mouth.      omega-3 acid ethyl esters (LOVAZA) 1 gram capsule Take 2 capsules by mouth 2 (two) times daily.      rosuvastatin (CRESTOR) 10 MG tablet Take 10 mg by mouth every evening.         Past Surgical History:   Procedure Laterality Date    BILATERAL TUBAL LIGATION      LAPAROSCOPY-ASSISTED VAGINAL HYSTERECTOMY         Diagnostic Studies:      Pre-op Vitals [12/17/24 0831]   BP Pulse Resp Temp SpO2   (!) 170/81 60 18 37.1 °C (98.8 °F) 100 %      Height Weight BMI (Calculated)     5' 4" 221 lb 37.9          Pre-op Assessment    I have reviewed the Patient Summary Reports.     I have reviewed the Nursing Notes.       Review of Systems  Cardiovascular:                hyperlipidemia                               Musculoskeletal:  Arthritis               Endocrine:  Diabetes               Physical Exam  General: Well nourished and Cooperative    Airway:  Mallampati: III / II  Mouth Opening: Normal  TM Distance: Normal  Tongue: Normal    Dental:  Intact    Chest/Lungs:  Normal Respiratory Rate        Anesthesia Plan  Type of Anesthesia, " risks & benefits discussed:    Anesthesia Type: Spinal, Regional, Gen ETT  Intra-op Monitoring Plan: Standard ASA Monitors  Post Op Pain Control Plan: peripheral nerve block, multimodal analgesia and IV/PO Opioids PRN  Induction:  IV  Informed Consent: Informed consent signed with the Patient and all parties understand the risks and agree with anesthesia plan.  All questions answered.   ASA Score: 2    Ready For Surgery From Anesthesia Perspective.     .  Outside records  H/H 12/39  Platelets 286  EKG NSR

## 2024-12-17 NOTE — PT/OT/SLP EVAL
"Physical Therapy Evaluation and Treatment    Patient Name: Faith Bolton   MRN: [id number]  Recent Surgery: Procedure(s) (LRB):  ARTHROPLASTY, KNEE, TOTAL, USING COMPUTER-ASSISTED NAVIGATION - right - STERLING (Right) Day of Surgery    Recommendations:     Discharge Recommendations: Low Intensity Therapy   Discharge Equipment Recommendations: bedside commode, bath bench, walker, rolling   Barriers to discharge: None    Assessment:     Faith Bolton is a 49 y.o. female admitted with a medical diagnosis of Primary osteoarthritis of right knee. She presents with the following impairments/functional limitations: weakness, impaired endurance, impaired self care skills, impaired functional mobility, gait instability, impaired balance, decreased lower extremity function, pain, decreased ROM, edema. Pt presents s/p R TKA with expected post-op deficits.  Pt did  well with PT today and was able to amb 35 feet with RW and CGA .  She was limited with gait distance due to pain and weakness. She had no LOB during gait.  She will benefit from cont PT to maximize  functional recovery, strength and ROM.      Rehab Prognosis: Good; patient would benefit from acute PT services to address these deficits and reach maximum level of function.    Plan:     During this hospitalization, patient to be seen daily to address the above listed problems via gait training, therapeutic activities, therapeutic exercises, neuromuscular re-education    Plan of Care Expires: 01/16/25    Subjective     Chief Complaint: Pt reports pain as " not bad" when at rest  Patient Comments/Goals: to go home  Pain/Comfort:  Pain Rating 1: 4/10  Location - Side 1: Right  Location 1: knee  Pain Addressed 1: Pre-medicate for activity, Reposition, Distraction  Pain Rating Post-Intervention 1: 4/10    Social History:  Living Environment: Patient lives with their  who works  in  a trailer with 5 CEE and no rails  with tub-shower combo  Prior Level of " Function: Prior to admission, patient was independent  Equipment Used at Home: none  DME owned (not currently used): none  Assistance Upon Discharge:  her dtr due to her  works.  Pt may be going to her dtr's home instead of her trailer when she returns to [place]    Objective:     Communicated with RN prior to session. Patient found HOB elevated with cryotherapy, peripheral IV, perineural catheter, FCD upon PT entry to room.  Pt's dtr present in room.    General Precautions: Standard, fall   Orthopedic Precautions: RLE weight bearing as tolerated   Braces: N/A    Respiratory Status: Room air    Exams:  RLE ROM: WFL except decrease knee flex/ext due to pain and post op bandages  RLE Strength:  at least 3/5  LLE ROM: WNL  LLE Strength: WNL  Cognitive: Patient is oriented to Person, Place, Time, Situation  Gross Motor Coordination: WFL  Postural Exam: Patient presented with the following abnormalities:    -       Rounded shoulders  -       Forward head  Sensation:    -       Intact    Functional Mobility:  Gait belt applied - Yes  Bed Mobility  Supine to Sit: minimum assistance for LE management  Transfers  Sit to Stand: contact guard assistance with rolling walker and with cues for hand placement and foot placement  Gait  Patient ambulated 35 feet with rolling walker and contact guard assistance. Patient required cues for heel strike, position in walker, sequencing, upright posture, and weight bearing status to increase independence and safety. Patient required cues ~ 25% of the time.  Pt amb with very short step length and decrease mj with increase time to advance R LE   Balance  Sitting: GOOD: Maintains balance through MODERATE excursions of active trunk movement  Standing: FAIR: Needs CONTACT GUARD during gait    Therapeutic Activities and Exercises:  Patient educated on role of acute care PT and PT POC, safety while in hospital including calling nurse for mobility, and call light usage.  Educated about  weightbearing as betty R LE and provided cuing for adherence as appropriate during session.  Educated about importance of OOB mobility and remaining up in chair most of the day.  Pt ed on use of cryotherapy for edema and pain control, and on safety awareness with use of RW in the home and pt verbalized good understanding back to PT.   Pt ed on importance of elevating  LE above level of her heart 3-4 times a day and proper placement of pillows to keep knee extended and not flexed.  Pt and her caregiver verbalized good understanding back to PT.      AM-PAC 6 CLICK MOBILITY  Total Score:18    Patient left up in chair with all lines intact, call button in reach, RN notified, and dtr present.    GOALS:   Multidisciplinary Problems       Physical Therapy Goals          Problem: Physical Therapy    Goal Priority Disciplines Outcome Interventions   Physical Therapy Goal     PT, PT/OT Progressing    Description: Goals to be met by: 24     Patient will increase functional independence with mobility by performin. Supine to sit with SBA  2. Sit to stand transfer with Stand-by Assistance  3. Gait  x 150 feet with Stand-by Assistance using Rolling Walker.   4. Ascend/descend 5 stair with no Handrails Contact Guard Assistance using HHA or LRAD.   5. Lower extremity TKA home exercise program x 10 reps per handout, with supervision      Patient demonstrates a mobility limitation that significantly impairs their ability to participate in one or more mobility related activities of daily living. Patient's mobility limitation cannot be sufficiently resolved with the use of a cane, but can be sufficiently resolved with the use of a rolling walker.The use of a rolling walker will considerably improve their ability to participate in MRADLs. Patient will use the walker on a regular basis at home.                            History:     Past Medical History:   Diagnosis Date    Adenomyosis     Anemia, unspecified     Mixed  hyperlipidemia     Osteoarthritis of wrist     bilateral    Primary osteoarthritis of left knee     Primary osteoarthritis of right knee     Type 2 diabetes mellitus without complications     Uterine leiomyoma        Past Surgical History:   Procedure Laterality Date    BILATERAL TUBAL LIGATION      LAPAROSCOPY-ASSISTED VAGINAL HYSTERECTOMY         Time Tracking:     PT Received On: 12/17/24  PT Start Time: 1527  PT Stop Time: 1547  PT Total Time (min): 20 min     Billable Minutes: Evaluation 10 and Gait Training 10    12/17/2024

## 2024-12-17 NOTE — TRANSFER OF CARE
"Anesthesia Transfer of Care Note    Patient: Faith Bolton    Procedure(s) Performed: Procedure(s) (LRB):  ARTHROPLASTY, KNEE, TOTAL, USING COMPUTER-ASSISTED NAVIGATION - right - STERLING (Right)    Patient location: PACU    Anesthesia Type: general    Transport from OR: Transported from OR on room air with adequate spontaneous ventilation    Post pain: adequate analgesia    Post assessment: no apparent anesthetic complications and tolerated procedure well    Post vital signs: stable    Level of consciousness: awake and alert    Nausea/Vomiting: no nausea/vomiting    Complications: none    Transfer of care protocol was followed      Last vitals: Visit Vitals  /68 (BP Location: Left arm, Patient Position: Lying)   Pulse 68   Temp 37.1 °C (98.8 °F) (Temporal)   Resp 13   Ht 5' 4" (1.626 m)   Wt 100.2 kg (221 lb)   LMP 01/01/2017   SpO2 100%   Breastfeeding No   BMI 37.93 kg/m²     "

## 2024-12-17 NOTE — PLAN OF CARE
Problem: Physical Therapy  Goal: Physical Therapy Goal  Description: Goals to be met by: 24     Patient will increase functional independence with mobility by performin. Supine to sit with SBA  2. Sit to stand transfer with Stand-by Assistance  3. Gait  x 150 feet with Stand-by Assistance using Rolling Walker.   4. Ascend/descend 5 stair with no Handrails Contact Guard Assistance using HHA or LRAD.   5. Lower extremity TKA home exercise program x 10 reps per handout, with supervision      Patient demonstrates a mobility limitation that significantly impairs their ability to participate in one or more mobility related activities of daily living. Patient's mobility limitation cannot be sufficiently resolved with the use of a cane, but can be sufficiently resolved with the use of a rolling walker.The use of a rolling walker will considerably improve their ability to participate in MRADLs. Patient will use the walker on a regular basis at home.       Outcome: Progressing

## 2024-12-18 ENCOUNTER — TELEPHONE (OUTPATIENT)
Dept: ORTHOPEDICS | Facility: CLINIC | Age: 49
End: 2024-12-18
Payer: COMMERCIAL

## 2024-12-18 VITALS
HEART RATE: 63 BPM | BODY MASS INDEX: 37.73 KG/M2 | SYSTOLIC BLOOD PRESSURE: 131 MMHG | DIASTOLIC BLOOD PRESSURE: 69 MMHG | HEIGHT: 64 IN | RESPIRATION RATE: 16 BRPM | OXYGEN SATURATION: 100 % | TEMPERATURE: 98 F | WEIGHT: 221 LBS

## 2024-12-18 LAB
POCT GLUCOSE: 123 MG/DL (ref 70–110)
POCT GLUCOSE: 204 MG/DL (ref 70–110)

## 2024-12-18 PROCEDURE — 94761 N-INVAS EAR/PLS OXIMETRY MLT: CPT

## 2024-12-18 PROCEDURE — 97535 SELF CARE MNGMENT TRAINING: CPT

## 2024-12-18 PROCEDURE — 97116 GAIT TRAINING THERAPY: CPT

## 2024-12-18 PROCEDURE — 97110 THERAPEUTIC EXERCISES: CPT

## 2024-12-18 PROCEDURE — 25000003 PHARM REV CODE 250: Performed by: NURSE PRACTITIONER

## 2024-12-18 PROCEDURE — 25000003 PHARM REV CODE 250: Performed by: ANESTHESIOLOGY

## 2024-12-18 PROCEDURE — 97530 THERAPEUTIC ACTIVITIES: CPT

## 2024-12-18 PROCEDURE — 63600175 PHARM REV CODE 636 W HCPCS: Performed by: NURSE PRACTITIONER

## 2024-12-18 PROCEDURE — 99900035 HC TECH TIME PER 15 MIN (STAT)

## 2024-12-18 RX ORDER — CELECOXIB 200 MG/1
200 CAPSULE ORAL DAILY
Status: DISCONTINUED | OUTPATIENT
Start: 2024-12-18 | End: 2024-12-18 | Stop reason: HOSPADM

## 2024-12-18 RX ADMIN — MUPIROCIN 1 G: 20 OINTMENT TOPICAL at 08:12

## 2024-12-18 RX ADMIN — ACETAMINOPHEN 1000 MG: 500 TABLET ORAL at 05:12

## 2024-12-18 RX ADMIN — ASPIRIN 81 MG: 81 TABLET, COATED ORAL at 08:12

## 2024-12-18 RX ADMIN — FAMOTIDINE 20 MG: 20 TABLET ORAL at 08:12

## 2024-12-18 RX ADMIN — CEFAZOLIN 2 G: 2 INJECTION, POWDER, FOR SOLUTION INTRAMUSCULAR; INTRAVENOUS at 01:12

## 2024-12-18 RX ADMIN — OXYCODONE 10 MG: 5 TABLET ORAL at 03:12

## 2024-12-18 RX ADMIN — CELECOXIB 200 MG: 200 CAPSULE ORAL at 08:12

## 2024-12-18 RX ADMIN — METFORMIN HYDROCHLORIDE 500 MG: 500 TABLET, EXTENDED RELEASE ORAL at 08:12

## 2024-12-18 RX ADMIN — Medication 400 ML: at 12:12

## 2024-12-18 RX ADMIN — ONDANSETRON 4 MG: 2 INJECTION INTRAMUSCULAR; INTRAVENOUS at 08:12

## 2024-12-18 RX ADMIN — METHOCARBAMOL 750 MG: 750 TABLET ORAL at 08:12

## 2024-12-18 RX ADMIN — SENNOSIDES AND DOCUSATE SODIUM 1 TABLET: 50; 8.6 TABLET ORAL at 08:12

## 2024-12-18 RX ADMIN — Medication 400 ML: at 08:12

## 2024-12-18 NOTE — NURSING
Has met unit/department guidelines for discharge from each phase of the post procedure continuum. Patient discharged.  Instructions, placed in dc folder and Prescriptions given.  IV removed, tolerated well, w/ catheter intact, no redness or swelling to area. Dressing to right knee remains CDI. Patient verbalized understanding instructions.  AAOx3, VSS, NADN, no complaints of pain noted at this time.  Wheelchair to private vehicle in care of lyft with daughter.  All personal belongings sent with pt.  Blue Bracelet given applied to pts wrist and instructions given to call # on bracelet w/any surgery related issues.      Infu Block Pump teaching done w/patient & patients daughter.Instructed to remove on day 5, Sunday (12/18/24) at 12 noon or when pump alarms infusion complete. Demonstrated and explained how to remove catheter.Pt and pts daughter verbalized understanding.  All questions answered. Nimbus Pump home care instxn pamphlet provided, home care supplies provided to patient and placed in discharge folder. Encouraged to contact anesthesia using # on brochure/ on sticker on catheter site, with any questions/concerns re: pain, side effects. Instructed to call Infu Block  # for any pump related issues. Informed that pt/fmly will receive follow up calls from [facility].

## 2024-12-18 NOTE — PT/OT/SLP PROGRESS
"Occupational Therapy   Treatment and Discharge Note    Name: Faith Bolton  MRN: [id number]  Admitting Diagnosis:  Primary osteoarthritis of right knee  1 Day Post-Op    Recommendations:     Discharge Recommendations: Low Intensity Therapy  Discharge Equipment Recommendations:  bedside commode, bath bench, walker, rolling  Barriers to discharge:  None    Assessment:     Faith Bolton is a 49 y.o. female with a medical diagnosis of Primary osteoarthritis of right knee.  She presents with the following performance deficits affecting function: impaired self care skills, impaired functional mobility, gait instability, impaired balance, pain. Pt was willing to participate, tolerated session well overall. She was able to ambulate to bathroom to perform hygiene tasks before returning to chair to perform dressing tasks. She is currently functioning at appropriate independence levels with adequate support at home to be discharged from acute OT services at this time.    Pt will have support from daughter upon d/c.    Plan:     Patient to be discharged from acute OT services at this time.    Subjective     Chief Complaint: R knee pain, nausea  Patient/Family Comments/goals: "My other knee is popping."  Pain/Comfort:  Pain Rating 1: 2/10  Location - Side 1: Right  Location - Orientation 1: generalized  Location 1: knee  Pain Addressed 1: Reposition, Distraction  Pain Rating Post-Intervention 1:  (not rated)    Objective:     Communicated with: RN prior to session.  Patient found up in chair with cryotherapy, FCD, perineural catheter upon OT entry to room.    General Precautions: Standard, fall    Orthopedic Precautions:RLE weight bearing as tolerated  Braces: N/A  Respiratory Status: Room air     Occupational Performance:     Bed Mobility:    Pt started and finished session in chair     Functional Mobility/Transfers:  Patient completed Sit <> Stand Transfer with stand by assistance  with  rolling walker   Chair " t/f: close SPV c/ RW  Functional Mobility: from chair to bathroom back to chair; close SPV c/ RW; no LOB    Activities of Daily Living:  Grooming: supervision pt brushed teeth, washed face standing at sink  Upper Body Dressing: setup/SPV; pt donned shirt seated in chair    Lower Body Dressing: setup/SPV; pt donned underwear and pants seated in chair    Toileting: pt not needing to void at this time        [facility] 6 Click ADL: 23    Treatment & Education:  Pt edu on role of OT, POC, safety when performing self care tasks , benefit of performing OOB activity, and safety when performing functional transfers and mobility.  - White board updated  - Self care tasks completed-- as noted above    - pt educated on proper sequencing for LB dressing  - pt educated on proper sequencing for getting into/out of vehicle    Patient left up in chair with all lines intact, call button in reach, RN notified, and daughter present    GOALS:   Multidisciplinary Problems       Occupational Therapy Goals          Problem: Occupational Therapy    Goal Priority Disciplines Outcome Interventions   Occupational Therapy Goal     OT, PT/OT Progressing    Description: Goals to be met by: 12/18/2024     Patient will increase functional independence with ADLs by performing:    UE Dressing with Supervision.  LE Dressing with Supervision.  Grooming while standing at sink with Supervision.  Toileting from toilet with Supervision for hygiene and clothing management.   Toilet transfer to toilet with Supervision.                         Time Tracking:     OT Date of Treatment: 12/18/24  OT Start Time: 0906  OT Stop Time: 0925  OT Total Time (min): 19 min    Billable Minutes:Self Care/Home Management 19    OT/ISSAC: OT          12/18/2024

## 2024-12-18 NOTE — PROGRESS NOTES
Acute Pain Service and Perioperative Surgical Home Progress Note    HPI  Faiht Bolton is a 49 y.o., female,     Interval history      Surgery:  Procedure(s) (LRB):  ARTHROPLASTY, KNEE, TOTAL, USING COMPUTER-ASSISTED NAVIGATION - right - STERLING (Right)    Post Op Day #: 1    Rested well overnight.  Eating and drinking with no nausea or vomiting.  Ambulating with assistance to restroom with good urine output.  Pain well controlled with multimodal regimen.    Catheter type: Perineural Adductor Canal    Infusion type: Ropivacaine 0.2%, with a 7cc automatic bolus every 3 hours, combined with a 5 cc patient controlled bolus available [id number]    Problem List:    Active Hospital Problems    Diagnosis  POA    *Primary osteoarthritis of right knee [M17.11]  Yes      Resolved Hospital Problems   No resolved problems to display.       Subjective:       General appearance of alert, oriented, no complaints   Pain with rest: 2    Numbers   Pain with movement: 2    Numbers   Side Effects    1. Pruritis No    2. Nausea No    3. Motor Blockade Yes, 0=Ability to raise lower extremities off bed    4. Sedation Yes, 1=awake and alert    Schedule Medications:    acetaminophen  1,000 mg Oral Q6H    aspirin  81 mg Oral BID    atorvastatin  40 mg Oral QHS    celecoxib  200 mg Oral Daily    electrolytes-dextrose  400 mL Oral Q4H    famotidine  20 mg Oral BID    metFORMIN  500 mg Oral BID WM    methocarbamoL  750 mg Oral TID    mupirocin  1 g Nasal BID    polyethylene glycol  17 g Oral Daily    pregabalin  75 mg Oral QHS    senna-docusate 8.6-50 mg  1 tablet Oral BID        Continuous Infusions:   ropivacaine 0.2% Perineural Pump infusion 500 ML   Perineural Continuous   New Bag at 12/17/24 1238        PRN Medications:    Current Facility-Administered Medications:     bisacodyL, 10 mg, Rectal, Q12H PRN    dextrose 10%, 12.5 g, Intravenous, PRN    dextrose 10%, 25 g, Intravenous, PRN    glucagon (human recombinant), 1 mg, Intramuscular,  "PRN    glucose, 16 g, Oral, PRN    glucose, 24 g, Oral, PRN    insulin aspart U-100, 0-10 Units, Subcutaneous, QID (AC + HS) PRN    morphine, 2 mg, Intravenous, Q3H PRN    naloxone, 0.02 mg, Intravenous, PRN    ondansetron, 4 mg, Intravenous, Q8H PRN    oxyCODONE, 10 mg, Oral, Q3H PRN    oxyCODONE, 5 mg, Oral, Q3H PRN    prochlorperazine, 5 mg, Intravenous, Q6H PRN       Antibiotics:  Antibiotics (From admission, onward)      Start     Stop Route Frequency Ordered    12/17/24 2100  mupirocin 2 % ointment 1 g         12/22/24 2059 Nasl 2 times daily 12/17/24 1418               Objective:     Catheter site clean, dry, intact          Vital Signs (Most Recent):  Temp: 98 °F (36.7 °C) (12/18/24 0444)  Pulse: 63 (12/18/24 0444)  Resp: 16 (12/18/24 0444)  BP: 127/65 (12/18/24 0444)  SpO2: 99 % (12/18/24 0444) Vital Signs Range (Last 24H):  Temp:  [97.7 °F (36.5 °C)-98.8 °F (37.1 °C)]   Pulse:  [49-82]   Resp:  [11-18]   BP: (122-170)/(61-81)   SpO2:  [95 %-100 %]          I & O (Last 24H):  Intake/Output Summary (Last 24 hours) at 12/18/2024 0541  Last data filed at 12/18/2024 0444  Gross per 24 hour   Intake 2540 ml   Output 1600 ml   Net 940 ml       Physical Exam:    GA: Alert, comfortable, no acute distress.   Pulmonary: Clear to auscultation. Normal RR.    Cardiac: regular rate and rhythm.      Laboratory: reviewed in chart  CBC: No results for input(s): "WBC", "RBC", "HGB", "HCT", "PLT", "MCV", "MCH", "MCHC" in the last 72 hours.    BMP: No results for input(s): "NA", "K", "CO2", "CL", "BUN", "CREATININE", "GLU", "MG", "PHOS", "CALCIUM" in the last 72 hours.    No results for input(s): "PT", "INR", "PROTIME", "APTT" in the last 72 hours.          Assessment:         Pain control adequate    Plan:     1) Pain: Adductor canal perineural catheter in place and infusing. Dressing in tact.  Multimodal pain regimen ordered which includes acetaminophen, celecoxib, pregabalin, and prn oxycodone.  Will continue to monitor. " Plan to discharge with Perineural Pain Pump.   2) diabetes: diabetic diet with sliding scale   3) FEN/GI: Tolerating regular diet.     4) Dispo: Pt working well with PT/OT. Case management and SW following along for setting up home health and physical therapy. Plan to discharge home this am.          Evaluator Dimple Luther PA-C

## 2024-12-18 NOTE — PLAN OF CARE
Problem: Diabetes Comorbidity  Goal: Blood Glucose Level Within Targeted Range  Outcome: Progressing  Intervention: Monitor and Manage Glycemia  Flowsheets (Taken 12/18/2024 0231)  Glycemic Management:   blood glucose monitored   oral hydration promoted     Problem: Pain Acute  Goal: Optimal Pain Control and Function  Outcome: Progressing  Intervention: Develop Pain Management Plan  Flowsheets (Taken 12/18/2024 0231)  Pain Management Interventions:   care clustered   cold applied   medication offered   pain management plan reviewed with patient/caregiver   pain pump in use  Intervention: Prevent or Manage Pain  Flowsheets (Taken 12/18/2024 0231)  Sleep/Rest Enhancement: awakenings minimized  Sensory Stimulation Regulation:   care clustered   quiet environment promoted  Bowel Elimination Promotion: adequate fluid intake promoted  Medication Review/Management: medications reviewed  Intervention: Optimize Psychosocial Wellbeing  Flowsheets (Taken 12/18/2024 0231)  Supportive Measures: verbalization of feelings encouraged  Plan of care reviewed with patient with daughter present; verbalized understanding. Medications reviewed and administered as ordered.Rounding for safety and patient care per policy. Safety precautions maintained. DME at bedside. Ambulating to restroom with x1 assistance and walker. Ropivacaine pump infusing at documented settings (per MAR), dressing C/D/I. Call light within reach, bed wheels locked, bed in lowest position, side rails ^x2, safety maintained. NADN, Will continue monitor.

## 2024-12-18 NOTE — PLAN OF CARE
Problem: Physical Therapy  Goal: Physical Therapy Goal  Description: Goals to be met by: 24     Patient will increase functional independence with mobility by performin. Supine to sit with SBA- Not Met  2. Sit to stand transfer with Stand-by Assistance- Met  3. Gait  x 150 feet with Stand-by Assistance using Rolling Walker. - Met  4. Ascend/descend 5 stair with no Handrails Contact Guard Assistance using HHA or LRAD. - Not Met  5. Lower extremity TKA home exercise program x 10 reps per handout, with supervision - Met      Patient demonstrates a mobility limitation that significantly impairs their ability to participate in one or more mobility related activities of daily living. Patient's mobility limitation cannot be sufficiently resolved with the use of a cane, but can be sufficiently resolved with the use of a rolling walker.The use of a rolling walker will considerably improve their ability to participate in MRADLs. Patient will use the walker on a regular basis at home.       Outcome: Adequate for Care Transition

## 2024-12-18 NOTE — PROGRESS NOTES
[facility])  Orthopedics  Progress Note    Patient Name: Faith Bolton  MRN: [id number]  Admission Date: 12/17/2024  Hospital Length of Stay: 1 days  Attending Provider: Bull Donahue MD  Primary Care Provider: No primary care provider on file.  Follow-up For: Procedure(s) (LRB):  ARTHROPLASTY, KNEE, TOTAL, USING COMPUTER-ASSISTED NAVIGATION - right - STERLING (Right)    Post-Operative Day: 1 Day Post-Op  Subjective:     Principal Problem:Primary osteoarthritis of right knee    Principal Orthopedic Problem: same, s/p R TKA    Interval History: No acute events overnight. Vitals within normal limits. Pain well controlled. Patient ambulated well yesterday. Denies numbness, tingling, and weakness. Voiding spontaneously. No other acute complaints.    Review of patient's allergies indicates:  No Known Allergies    Current Facility-Administered Medications   Medication    acetaminophen tablet 1,000 mg    aspirin EC tablet 81 mg    atorvastatin tablet 40 mg    bisacodyL suppository 10 mg    celecoxib capsule 200 mg    dextrose 10% bolus 125 mL 125 mL    dextrose 10% bolus 250 mL 250 mL    electrolytes-dextrose (Pedialyte) oral solution 400 mL    famotidine tablet 20 mg    glucagon (human recombinant) injection 1 mg    glucose chewable tablet 16 g    glucose chewable tablet 24 g    insulin aspart U-100 pen 0-10 Units    metFORMIN ER 24hr tablet 500 mg    methocarbamoL tablet 750 mg    morphine injection 2 mg    mupirocin 2 % ointment 1 g    naloxone 0.4 mg/mL injection 0.02 mg    ondansetron injection 4 mg    oxyCODONE immediate release tablet 10 mg    oxyCODONE immediate release tablet 5 mg    polyethylene glycol packet 17 g    pregabalin capsule 75 mg    prochlorperazine injection Soln 5 mg    ropivacaine 0.2% Perineural Pump infusion 500 ML    senna-docusate 8.6-50 mg per tablet 1 tablet     Objective:     Vital Signs (Most Recent):  Temp: 98 °F (36.7 °C) (12/18/24 0444)  Pulse: 63 (12/18/24  "0444)  Resp: 16 (12/18/24 0444)  BP: 127/65 (12/18/24 0444)  SpO2: 99 % (12/18/24 0444) Vital Signs (24h Range):  Temp:  [97.7 °F (36.5 °C)-98.8 °F (37.1 °C)] 98 °F (36.7 °C)  Pulse:  [49-82] 63  Resp:  [11-18] 16  SpO2:  [95 %-100 %] 99 %  BP: (122-170)/(61-81) 127/65     Weight: 100.2 kg (221 lb)  Height: 5' 4" (162.6 cm)  Body mass index is 37.93 kg/m².      Intake/Output Summary (Last 24 hours) at 12/18/2024 0627  Last data filed at 12/18/2024 0535  Gross per 24 hour   Intake 2540 ml   Output 1700 ml   Net 840 ml        Ortho/SPM Exam  Gen: NAD, sitting comfortably in bed  CV: regular rate  Resp: non-labored respirations    RLE:  Dressing clean, dry, and intact  No significant hematoma over operative site  Appropriate postoperative TTP  SILT Sa/Gaines/DP/SP/T  Motor intact EHL/FHL/TA/Gastroc  2+ DP, 2+ PT       Significant Labs: All pertinent labs within the past 24 hours have been reviewed.    Significant Imaging: I have reviewed and interpreted all pertinent imaging results/findings.  Assessment/Plan:     * Primary osteoarthritis of right knee  Faith Bolton is a 49 y.o. female sp R TKA on 12/17/24. Doing well this morning.    Plan:  Pain control: multimodal  PT/OT: WBAT RLE  DVT PPx: ASA 81 mg BID, SCDs at all times when not ambulating  Abx: postop Ancef    Dispo: discharge to [facility] after therapy            Demar Gardner MD  Orthopedics  [place] [facility] ([facility]    "

## 2024-12-18 NOTE — TELEPHONE ENCOUNTER
Attempted to reach [facility] - no answer  Contacted patient - she has not heard from them either     Pt called [employer], got new therapist.   [facility]   P [phone]  F [phone]  Cannot see her until first of year - will do [facility] [place] [facility] until then.   Packet sent to [facility] & [facility] at this time      Also got 2 w p/o appt scheduled with Dr. Coreas

## 2024-12-18 NOTE — SUBJECTIVE & OBJECTIVE
"Principal Problem:Primary osteoarthritis of right knee    Principal Orthopedic Problem: same, s/p R TKA    Interval History: No acute events overnight. Vitals within normal limits. Pain well controlled. Patient ambulated well yesterday. Denies numbness, tingling, and weakness. Voiding spontaneously. No other acute complaints.    Review of patient's allergies indicates:  No Known Allergies    Current Facility-Administered Medications   Medication    acetaminophen tablet 1,000 mg    aspirin EC tablet 81 mg    atorvastatin tablet 40 mg    bisacodyL suppository 10 mg    celecoxib capsule 200 mg    dextrose 10% bolus 125 mL 125 mL    dextrose 10% bolus 250 mL 250 mL    electrolytes-dextrose (Pedialyte) oral solution 400 mL    famotidine tablet 20 mg    glucagon (human recombinant) injection 1 mg    glucose chewable tablet 16 g    glucose chewable tablet 24 g    insulin aspart U-100 pen 0-10 Units    metFORMIN ER 24hr tablet 500 mg    methocarbamoL tablet 750 mg    morphine injection 2 mg    mupirocin 2 % ointment 1 g    naloxone 0.4 mg/mL injection 0.02 mg    ondansetron injection 4 mg    oxyCODONE immediate release tablet 10 mg    oxyCODONE immediate release tablet 5 mg    polyethylene glycol packet 17 g    pregabalin capsule 75 mg    prochlorperazine injection Soln 5 mg    ropivacaine 0.2% Perineural Pump infusion 500 ML    senna-docusate 8.6-50 mg per tablet 1 tablet     Objective:     Vital Signs (Most Recent):  Temp: 98 °F (36.7 °C) (12/18/24 0444)  Pulse: 63 (12/18/24 0444)  Resp: 16 (12/18/24 0444)  BP: 127/65 (12/18/24 0444)  SpO2: 99 % (12/18/24 0444) Vital Signs (24h Range):  Temp:  [97.7 °F (36.5 °C)-98.8 °F (37.1 °C)] 98 °F (36.7 °C)  Pulse:  [49-82] 63  Resp:  [11-18] 16  SpO2:  [95 %-100 %] 99 %  BP: (122-170)/(61-81) 127/65     Weight: 100.2 kg (221 lb)  Height: 5' 4" (162.6 cm)  Body mass index is 37.93 kg/m².      Intake/Output Summary (Last 24 hours) at 12/18/2024 0629  Last data filed at 12/18/2024 " 0535  Gross per 24 hour   Intake 2540 ml   Output 1700 ml   Net 840 ml        Ortho/SPM Exam  Gen: NAD, sitting comfortably in bed  CV: regular rate  Resp: non-labored respirations    RLE:  Dressing clean, dry, and intact  No significant hematoma over operative site  Appropriate postoperative TTP  SILT Sa/Gaines/DP/SP/T  Motor intact EHL/FHL/TA/Gastroc  2+ DP, 2+ PT       Significant Labs: All pertinent labs within the past 24 hours have been reviewed.    Significant Imaging: I have reviewed and interpreted all pertinent imaging results/findings.

## 2024-12-18 NOTE — PT/OT/SLP PROGRESS
"Physical Therapy Treatment    Patient Name:  Faith Bolton   MRN:  [id number]    Recommendations:     Discharge Recommendations: Low Intensity Therapy  Discharge Equipment Recommendations: bedside commode, bath bench, walker, rolling  Barriers to discharge: None    Assessment:     Faith Bolton is a 49 y.o. female admitted with a medical diagnosis of Primary osteoarthritis of right knee.  She presents with the following impairments/functional limitations: weakness, impaired endurance, impaired self care skills, impaired functional mobility, gait instability, impaired balance, decreased lower extremity function, pain, decreased ROM, edema  Pt did really well with PT today and was able to amb 150' with RW and SBA .    She ascended/descended 6" curb step with RW and Min assist to ascend and CGA to descend.  Pt will be going to her dtr's one level home when they return to [place] and she will not have to do the stairs on her trailer.  Pt did well today with no LOB and no dizziness. She did have increase pain during ex's.    Pt is ready for d/c home today from PT standpoint with her dtr  to assist PRN and  will benefit from OPPT for cont PT to maximize  functional recovery, strength and ROM.    .    Rehab Prognosis: Good; patient would benefit from cont skilled PT services post d/c to address these deficits and reach maximum level of function.    Recent Surgery: Procedure(s) (LRB):  ARTHROPLASTY, KNEE, TOTAL, USING COMPUTER-ASSISTED NAVIGATION - right - STERLING (Right) 1 Day Post-Op    Plan:     During this hospitalization, patient to be d/c to [facility] today with her dtr to assist PRN and OPPT to address the identified rehab impairments via therapeutic activities, therapeutic exercises, gait training and progress toward the following goals:    Plan of Care Expires:  01/16/25    Subjective     Chief Complaint: Pt reports she didn't sleep last night .  Patient/Family Comments/goals: to go home and move " "without pain  Pain/Comfort:  Pain Rating 1: 2/10  Location - Side 1: Right  Location 1: knee  Pain Addressed 1: Pre-medicate for activity, Distraction, Reposition  Pain Rating Post-Intervention 1: 2/10      Objective:     Communicated with RN prior to session.  Patient found up in chair with cryotherapy, perineural catheter, peripheral IV, FCD upon PT entry to room.  Pt's dtr present in room and for entire PT session.    General Precautions: Standard, fall  Orthopedic Precautions: RLE weight bearing as tolerated  Braces: N/A  Respiratory Status: Room air     Functional Mobility:  Bed Mobility:     Supine to Sit: minimum assistance for R LE management  Sit to Supine: stand by assistance for LE management  Transfers:     Sit to Stand:  stand by assistance with rolling walker  Gait: Pt amb 150' x and then 20' x 1 with RW and SBA.   She required cues for posture, sequencing and heel strike to increase safety and indep with gait.   She amb with decrease step length, mj and step-to gait pattern.  Required increase time to perform but no LOB and no dizziness during gait training  Stairs:  Pt ascended/descended 6" curb step with Rolling Walker with no handrails with Contact Guard Assistance to descend and Minimal Assistance to ascend.   PT demonstrated proper sequencing and technique for pt prior to her performing stair training with PT.     AM-PAC 6 CLICK MOBILITY  Turning over in bed (including adjusting bedclothes, sheets and blankets)?: 3  Sitting down on and standing up from a chair with arms (e.g., wheelchair, bedside commode, etc.): 3  Moving from lying on back to sitting on the side of the bed?: 3  Moving to and from a bed to a chair (including a wheelchair)?: 3  Need to walk in hospital room?: 3  Climbing 3-5 steps with a railing?: 3  Basic Mobility Total Score: 18       Treatment & Education:  Patient educated on role of acute care PT and PT POC, safety while in hospital including calling nurse for mobility, " and call light usage.  Educated about weightbearing as betty and provided cuing for adherence as appropriate during session.  Educated about importance of OOB mobility and remaining up in chair most of the day.  Pt instructed on and performed therapeutic ex's for TKA HEP (QS, AP, GS, HS, Hip abd, SLR, SAQ, LAQ) x 10 reps x 3 sets each for muscle strengthening, endurance and increase knee ROM. Pt demonstrated good understanding back to PT. Patient required skilled PT for instruction of exercises and appropriate cues to perform exercises safely and appropriately.  Issued written handout of TKA HEP and reviewed with pt.  Pt ed on importance of elevating  LE above level of her heart 3-4 times a day and proper placement of pillows to keep knee extended and not flexed.  Pt and her caregiver verbalized good understanding back to PT.    Pt ed on mobility expectations at home after d/c and she verbalized good understanding back to PT  Pt ed on use of cryotherapy for edema and pain control, and on safety awareness with use of RW in the home and pt verbalized good understanding back to PT.   PT reviewed and demonstrated car transfers with pt and caregiver and answered all questions.  Pt and caregiver verbalized good understanding back to PT.       Patient left up in chair with all lines intact, call button in reach, RN notified, and dtr present..    GOALS:   Multidisciplinary Problems       Physical Therapy Goals          Problem: Physical Therapy    Goal Priority Disciplines Outcome Interventions   Physical Therapy Goal     PT, PT/OT Adequate for Care Transition    Description: Goals to be met by: 24     Patient will increase functional independence with mobility by performin. Supine to sit with SBA- Not Met  2. Sit to stand transfer with Stand-by Assistance- Met  3. Gait  x 150 feet with Stand-by Assistance using Rolling Walker. - Met  4. Ascend/descend 5 stair with no Handrails Contact Guard Assistance using HHA  or LRAD. - Not Met  5. Lower extremity TKA home exercise program x 10 reps per handout, with supervision - Met      Patient demonstrates a mobility limitation that significantly impairs their ability to participate in one or more mobility related activities of daily living. Patient's mobility limitation cannot be sufficiently resolved with the use of a cane, but can be sufficiently resolved with the use of a rolling walker.The use of a rolling walker will considerably improve their ability to participate in MRADLs. Patient will use the walker on a regular basis at home.                            Time Tracking:     PT Received On: 12/18/24  PT Start Time: 0958     PT Stop Time: 1051  PT Total Time (min): 53 min     Billable Minutes: Gait Training 23, Therapeutic Activity 12, and Therapeutic Exercise 18    Treatment Type: Treatment  PT/PTA: PT     Number of PTA visits since last PT visit: 0     12/18/2024

## 2024-12-19 ENCOUNTER — TELEPHONE (OUTPATIENT)
Dept: ORTHOPEDICS | Facility: CLINIC | Age: 49
End: 2024-12-19
Payer: COMMERCIAL

## 2024-12-19 ENCOUNTER — CLINICAL SUPPORT (OUTPATIENT)
Dept: REHABILITATION | Facility: HOSPITAL | Age: 49
End: 2024-12-19
Payer: COMMERCIAL

## 2024-12-19 DIAGNOSIS — M17.11 PRIMARY OSTEOARTHRITIS OF RIGHT KNEE: ICD-10-CM

## 2024-12-19 DIAGNOSIS — Z74.09 IMPAIRED FUNCTIONAL MOBILITY, BALANCE, GAIT, AND ENDURANCE: Primary | ICD-10-CM

## 2024-12-19 DIAGNOSIS — M25.661 DECREASED RANGE OF MOTION (ROM) OF RIGHT KNEE: ICD-10-CM

## 2024-12-19 PROCEDURE — 97161 PT EVAL LOW COMPLEX 20 MIN: CPT | Mod: PO

## 2024-12-19 PROCEDURE — 97110 THERAPEUTIC EXERCISES: CPT | Mod: PO

## 2024-12-19 PROCEDURE — 97112 NEUROMUSCULAR REEDUCATION: CPT | Mod: PO

## 2024-12-19 NOTE — DISCHARGE SUMMARY
[facility])  Orthopedics  Discharge Summary      Patient Name: Faith Bolton  MRN: [id number]  Admission Date: 12/17/2024  Hospital Length of Stay: 1 days  Discharge Date and Time: 12/18/2024 11:30 AM  Attending Physician: Bull Donahue MD   Discharging Provider: Demar Gardner MD  Primary Care Provider: No primary care provider on file.    HPI: Faith Bolton is a 49 y.o. female with history of Right knee pain. Pain is worse with activity and weight bearing.  Patient has experienced interference of activities of daily living due to decreased range of motion and an increase in joint pain and swelling.  Patient has failed non-operative treatment including NSAIDs, corticosteroid injections, viscosupplement injections, and activity modification.  Faith Bolton currently ambulates independently.      Relevant medical conditions of significance in perioperative period:  DM- on medication managed by pcp  HLD- on medication managed by pcp    Procedure(s) (LRB):  ARTHROPLASTY, KNEE, TOTAL, USING COMPUTER-ASSISTED NAVIGATION - right [place] [facility]      Hospital Course: On 12/17/24, the patient arrived to the [facility] for pre-operative management.  Upon completion of the pre-operative preparation, the patient was taken back to the operative theatre. The above procedure was performed without complication and the patient was transported to the post anesthesia care unit in stable condition.  After appropriate recovery from the anaesthetic agents used during the surgery, the patient was then transported to the inpatient floor.  The interim of the hospital stay from arrival on the floor up to discharge has been uncomplicated. The patient has tolerated regular diet.  The patient's pain has been controlled using a multimodal approach. Currently, the patient's pain is well controlled on an oral regimen.  The patient has been voiding without difficulty.  The patient began  participation in physical therapy after surgery and has progressed throughout the entire hospital stay.  Currently, the patient's progress is sufficient to allow the them to be discharged to the [facility] safely.  The patient agrees with this assessment and desires a discharge today.          Significant Diagnostic Studies: No pertinent studies.    Pending Diagnostic Studies:       None          Final Active Diagnoses:    Diagnosis Date Noted POA    PRINCIPAL PROBLEM:  Primary osteoarthritis of right knee [M17.11]  Yes      Problems Resolved During this Admission:      Discharged Condition: good    Disposition: Home or Self Care    Follow Up: in clinic as scheduled    Patient Instructions:      Activity as tolerated     Sponge bath only until clinic visit     Keep surgical extremity elevated     Lifting restrictions   Order Comments: No strenuous exercise or lifting of > 10 lbs     Weight bearing as tolerated     No driving, operating heavy equipment or signing legal documents while taking pain medication     Leave dressing on - Keep it clean, dry, and intact until clinic visit   Order Comments: Do not remove surgical dressing for 2 weeks post-op. This will be done only by MD at initial post-op visit. If dressing is completely saturated, replace with identical dressing - silver-impregnated hydrocolloid dressing.     Do not get dressings wet. Do not shower.     If dressing continues to be saturated or there are signs of infection, please call Ortho Clinic [phone] for further instructions and to make appt to be seen.     Call MD for:  temperature >100.4     Call MD for:  persistent nausea and vomiting     Call MD for:  severe uncontrolled pain     Call MD for:  difficulty breathing, headache or visual disturbances     Call MD for:  redness, tenderness, or signs of infection (pain, swelling, redness, odor or green/yellow discharge around incision site)     Call MD for:  hives     Call MD for:  persistent  dizziness or light-headedness     Call MD for:  extreme fatigue     Medications:  Reconciled Home Medications:      Medication List        CONTINUE taking these medications      acetaminophen 650 MG Tbsr  Commonly known as: TYLENOL  Take 1 tablet (650 mg total) by mouth every 8 (eight) hours.     aspirin 81 MG EC tablet  Commonly known as: ECOTRIN  Take 1 tablet (81 mg total) by mouth 2 (two) times a day.     celecoxib 200 MG capsule  Commonly known as: CeleBREX  [place] claudette cápsula (200 mg en total) por vía oral diariamente.  (Take 1 capsule (200 mg total) by mouth once daily.)     metFORMIN 500 MG ER 24hr tablet  Commonly known as: GLUCOPHAGE-XR  Take 500 mg by mouth.     methocarbamoL 750 MG Tab  Commonly known as: ROBAXIN  Take 1 tablet (750 mg total) by mouth 4 (four) times daily as needed (muscle spasms).     omega-3 acid ethyl esters 1 gram capsule  Commonly known as: LOVAZA  Take 2 capsules by mouth 2 (two) times daily.     oxyCODONE 5 MG immediate release tablet  Commonly known as: ROXICODONE  Take 1-2 tablets every 4-6 hours as needed for pain     pantoprazole 40 MG tablet  Commonly known as: PROTONIX  [place] claudette tableta (40 mg en total) por vía oral diariamente.  (Take 1 tablet (40 mg total) by mouth once daily.)     rosuvastatin 10 MG tablet  Commonly known as: CRESTOR  Take 10 mg by mouth every evening.     SENNA-S 8.6-50 mg per tablet  Generic drug: senna-docusate 8.6-50 mg  [place] claudette tableta por vía oral diariamente.  (Take 1 tablet by mouth once daily.)     VITAMIN D2 ORAL  Take 1.25 mg by mouth. weekly            STOP taking these medications      ibuprofen 800 MG tablet  Commonly known as: YARED ZAFAR MD  Orthopedics  [facility])

## 2024-12-19 NOTE — PROGRESS NOTES
"[facility]   Physical Therapy Treatment Note      Name: Faith Bolton  Clinic Number: [id number]    Therapy Diagnosis:   Encounter Diagnoses   Name Primary?    Primary osteoarthritis of right knee Yes    Impaired functional mobility, balance, gait, and endurance     Decreased range of motion (ROM) of right knee      Physician: Doctor, Outside    Visit Date: 12/20/2024    Physician Orders: PT Eval and Treat   Medical Diagnosis from Referral: M17.11 (ICD-10-CM) - Primary osteoarthritis of right knee   Evaluation Date: 12/19/2024  Authorization Period Expiration: 10/3/25  Plan of Care Expiration: 12/25/24  Progress Note Due: -  Date of Surgery: 12/17/24  Visit # / Visits authorized: 2/ 3   FOTO: 1/ 3     Precautions: Standard and Fall      Time In: 12:05  Time Out: 1:01  Total Billable Time: 46 minutes +10 mins ice    PTA Visit #: 0/5       Subjective     Patient reports: had pain so did not do her exercises at home. She is not feeling dizzy today. No falls.  She was not compliant with home exercise program.  Response to previous treatment: initial eval  Functional change: ongoing    Pain: 2/10  Location: right knee      Objective          Range of Motion:   Right Knee 12/19/24 12/20/24 12/23/24   Flexion Lacking 10 Lacking 6     Extension 70 77        Treatment   Bold=performed  Faith received the treatments listed below:      therapeutic exercises to develop strength, endurance, ROM, flexibility, posture, and core stabilization for 23 minutes including:  Knee extension PROM with heel prop x3 min   Ankle pumps x30  Long sit calf stretch 3x30"   Seated heel slides x3 min  FOTO     manual therapy techniques: Joint mobilizations, Manual traction, and Soft tissue Mobilization were applied for 0 minutes, including:        neuromuscular re-education activities to improve: Balance, Coordination, Kinesthetic, Sense, Proprioception, and Posture for 23 minutes. The following activities were " "included:  Quad sets [id number]"  SAQ 2x10  SLR 2x10  LAQ 2x10  Heel raises in walker 2x10  Weight shifts in walker x2 min     therapeutic activities to improve functional performance for 0  minutes, including:       Patient Education and Home Exercises       Education provided:   - education on condition  -range of motion goals      Written Home Exercises Provided: Yes. Exercises were reviewed and Faith was able to demonstrate them prior to the end of the session.  Faith demonstrated good  understanding of the education provided. See EMR under Patient Instructions for exercises provided during therapy sessions.    Assessment     Range of motion improved to 6-77 today and pt does very well with exercises. She was able to complete LAQ and SAQ without assist and demonstrates good form with all other activities.  Plan to add SLR at next visit if able. Pt to be discharged to [facility] visit.     Faith Is progressing well towards her goals.   Patient prognosis is Good.     Patient will continue to benefit from skilled outpatient physical therapy to address the deficits listed in the problem list box on initial evaluation, provide pt/family education and to maximize pt's level of independence in the home and community environment.     Patient's spiritual, cultural and educational needs considered and pt agreeable to plan of care and goals.     Anticipated barriers to physical therapy: returning to home state to complete PT     Short Term Goals: 1 weeks   Patient to achieve 0 degrees of knee extension  Patient to achieve 80 degrees of knee flexion  Patient to demonstrate independent sit to stand  Patient to demonstrate step through gait using front wheeled rolling walker  Pt to demonstrate ability to lift leg onto bed or plinth independently  Patient to demonstrate good quad set    Plan     Continue plan of care.     Jyoti Bosch, PT   "

## 2024-12-19 NOTE — PLAN OF CARE
[facility]   Physical Therapy Initial Evaluation      Name: Faith Bolton  Clinic Number: [id number]    Therapy Diagnosis:   Encounter Diagnoses   Name Primary?    Primary osteoarthritis of right knee     Impaired functional mobility, balance, gait, and endurance Yes    Decreased range of motion (ROM) of right knee         Physician: Doctor, Outside    Physician Orders: PT Eval and Treat   Medical Diagnosis from Referral: M17.11 (ICD-10-CM) - Primary osteoarthritis of right knee   Evaluation Date: 12/19/2024  Authorization Period Expiration: 10/3/25  Plan of Care Expiration: 12/25/24  Progress Note Due: -  Date of Surgery: 12/17/24  Visit # / Visits authorized: 1/ 3   FOTO: 1/ 3    Precautions: Standard and Fall     Time In: 12:30  Time Out: 1:30  Total Billable Time: 50 minutes +10 mins ice    Subjective     Date of onset: 12/17/24    History of current condition - Faith reports: two days status post right TKA with Dr. Donahue. Last dose of pain medication taken at 9:30 AM and pain is well controlled per patient report. Using a wheelchair in clinic today as she is reporting dizziness secondary to pain medication. She does have pain pump in place. She was not given compression stocking to wear.     Falls: no    Imaging: see chart    Prior Therapy: no  Social History:  lives with their spouse; steps in home but is staying with her daughter who does not have stairs  Occupation: [employer]  Prior Level of Function: independent  Current Level of Function: limited secondary to post op status     Pain:  Current 3/10, worst 9/10 (when doing the exercises she was given), best 3/10   Location: right TKA   Description: Variable  Aggravating Factors: exercises  Easing Factors: ice, pain medication    Patients goals: return to prior level of function.      Medical History:   Past Medical History:   Diagnosis Date    Adenomyosis     Anemia, unspecified     Mixed hyperlipidemia      Osteoarthritis of wrist     bilateral    Primary osteoarthritis of left knee     Primary osteoarthritis of right knee     Type 2 diabetes mellitus without complications     Uterine leiomyoma        Surgical History:   Faith Bolton  has a past surgical history that includes Laparoscopy-assisted vaginal hysterectomy; Bilateral tubal ligation; and arthroplasty, knee, total, using computer-assisted navigation (Right, 12/17/2024).    Medications:   Faith has a current medication list which includes the following prescription(s): acetaminophen, aspirin, celecoxib, ergocalciferol (vitamin d2), metformin, methocarbamol, omega-3 acid ethyl esters, oxycodone, pantoprazole, rosuvastatin, and senna-docusate 8.6-50 mg.    Allergies:   Review of patient's allergies indicates:  No Known Allergies     Objective      Observation: Present in wheelchair secondary to dizziness. Otherwise using rolling walker. No nain hose donned today (reports she was not given). Daughter is with her. Post-op dressing/brace in place. Bandage clean and dry. No bruising present today.     Gait: step to gait with front wheeled rolling walker     Functional tests:               SL Squat: Not performed 2/2 post-op.              DL Squat: Not performed 2/2 post-op.              Step-down: Not performed 2/2 post-op.              SLS EO: Not performed 2/2 post-op.              SLS EC: Not performed 2/2 post-op.     Range of Motion:   Knee Right Passive Left Passive   Flexion Lacking 10 Lacking 5   Extension 70 124      Lower Extremity Strength:  Formal MMT not performed 2/2 POD# 2 and increased pain.  Poor to fair quad activation noted R LE.     Special Tests:  Not performed 2/2 post-op.     Joint Mobility: Patellar mobility is limited as expected post-op.     Palpation: tender at lateral joint line, medial gastroc head, and quad muscle belly      Sensation:  Intact; diminished 2/2 residual nerve block.     Flexibility:  Grossly hypomobile quad,  "hamstring and gastroc as expected on post-op R LE     Edema: increased as expected post-op     Intake Outcome Measure for FOTO knee Survey    Therapist reviewed FOTO scores for Faith Bolton on 12/19/2024.   FOTO report - see Media section or FOTO account episode details.    Intake Score: 22%         Treatment   Bold=performed  Total Treatment time (time-based codes) separate from Evaluation: 30 minutes     Faith received the treatments listed below:      therapeutic exercises to develop strength, endurance, ROM, flexibility, posture, and core stabilization for 20 minutes including:  Knee extension PROM with heel prop x3 min   Ankle pumps x30  Long sit calf stretch 3x30"   Seated heel slides x3 min  FOTO     manual therapy techniques: Joint mobilizations, Manual traction, and Soft tissue Mobilization were applied for 0 minutes, including:        neuromuscular re-education activities to improve: Balance, Coordination, Kinesthetic, Sense, Proprioception, and Posture for 10 minutes. The following activities were included:  Quad sets [id number]"  SAQ 2x10  SLR 2x10  LAQ 2x10  Heel raises in walker 2x10  Weight shifts in walker x2 min     therapeutic activities to improve functional performance for 0  minutes, including:      Patient Education and Home Exercises     Education provided:   - education on condition  -range of motion goals     Written Home Exercises Provided: Yes. Exercises were reviewed and Faith was able to demonstrate them prior to the end of the session.  Faith demonstrated good  understanding of the education provided. See EMR under Patient Instructions for exercises provided during therapy sessions.    Assessment     Faith is a 49 y.o. female referred to outpatient Physical Therapy with a medical diagnosis of M17.11 (ICD-10-CM) - Primary osteoarthritis of right knee . Patient presents 2 days status post right TKA with normal post operative findings including gait dysfunction, limited lower " extremity strength and quad activation, and right knee range of motion deficits. She was able to perform exercises as noted above with good form and no reported increases in pain. Provided education to her daughter for range of motion goals and HEP compliance. Provided printed copy of HEP and will complete two additional visits per [facility] program protocol.     Patient prognosis is Good.   Patient will benefit from skilled outpatient Physical Therapy to address the deficits stated above and in the chart below, provide patient /family education, and to maximize patientt's level of independence.     Plan of care discussed with patient: Yes  Patient's spiritual, cultural and educational needs considered and patient is agreeable to the plan of care and goals as stated below:     Anticipated Barriers for therapy: returning to home state to complete PT    Medical Necessity is demonstrated by the following  History  Co-morbidities and personal factors that may impact the plan of care [x] LOW: no personal factors / co-morbidities  [] MODERATE: 1-2 personal factors / co-morbidities  [] HIGH: 3+ personal factors / co-morbidities    Moderate / High Support Documentation:   Co-morbidities affecting plan of care:     Personal Factors:   no deficits     Examination  Body Structures and Functions, activity limitations and participation restrictions that may impact the plan of care [x] LOW: addressing 1-2 elements  [] MODERATE: 3+ elements  [] HIGH: 4+ elements (please support below)    Moderate / High Support Documentation:      Clinical Presentation [x] LOW: stable  [] MODERATE: Evolving  [] HIGH: Unstable     Decision Making/ Complexity Score: low       Short Term Goals: 1 weeks   Patient to achieve 0 degrees of knee extension  Patient to achieve 80 degrees of knee flexion  Patient to demonstrate independent sit to stand  Patient to demonstrate step through gait using front wheeled rolling walker  Pt to demonstrate ability to lift  leg onto bed or plinth independently  Patient to demonstrate good quad set  Plan     Plan of care Certification: 12/19/2024 to 12/25/24.    Outpatient Physical Therapy 3 times weekly for 1 weeks to include the following interventions: Aquatic Therapy, Cervical/Lumbar Traction, Electrical Stimulation  , Gait Training, Manual Therapy, Moist Heat/ Ice, Neuromuscular Re-ed, Patient Education, Self Care, Therapeutic Activities, Therapeutic Exercise, and Ultrasound.     Jyoti Bosch, SHERRY        Physician's Signature: _________________________________________ Date: ________________

## 2024-12-19 NOTE — TELEPHONE ENCOUNTER
Check in with patient - dressing cdi, has blue armband, no questions about medications or care, is icing/elevating, doing HEP, using RW, PT today went better than expected, did have some significant pain this AM but after taking meds this was resolved.  No need a this time, will call if this changes.

## 2024-12-20 ENCOUNTER — CLINICAL SUPPORT (OUTPATIENT)
Dept: REHABILITATION | Facility: HOSPITAL | Age: 49
End: 2024-12-20
Payer: COMMERCIAL

## 2024-12-20 ENCOUNTER — TELEPHONE (OUTPATIENT)
Dept: ORTHOPEDICS | Facility: CLINIC | Age: 49
End: 2024-12-20
Payer: COMMERCIAL

## 2024-12-20 DIAGNOSIS — M25.661 DECREASED RANGE OF MOTION (ROM) OF RIGHT KNEE: ICD-10-CM

## 2024-12-20 DIAGNOSIS — Z74.09 IMPAIRED FUNCTIONAL MOBILITY, BALANCE, GAIT, AND ENDURANCE: ICD-10-CM

## 2024-12-20 DIAGNOSIS — M17.11 PRIMARY OSTEOARTHRITIS OF RIGHT KNEE: Primary | ICD-10-CM

## 2024-12-20 PROCEDURE — 97112 NEUROMUSCULAR REEDUCATION: CPT | Mod: PO

## 2024-12-20 PROCEDURE — 97110 THERAPEUTIC EXERCISES: CPT | Mod: PO

## 2024-12-20 NOTE — PROGRESS NOTES
"[facility]   Physical Therapy Treatment Note / Discharge Visit     Name: Faith Bolton  Clinic Number: [id number]    Therapy Diagnosis:   Encounter Diagnoses   Name Primary?    Primary osteoarthritis of right knee Yes    Impaired functional mobility, balance, gait, and endurance     Decreased range of motion (ROM) of right knee        Physician: Doctor, Outside    Visit Date: 12/23/2024    Physician Orders: PT Eval and Treat   Medical Diagnosis from Referral: M17.11 (ICD-10-CM) - Primary osteoarthritis of right knee   Evaluation Date: 12/19/2024  Authorization Period Expiration: 10/3/25  Plan of Care Expiration: 12/25/24  Progress Note Due: -  Date of Surgery: 12/17/24  Visit # / Visits authorized: 3/ 3   FOTO: 3/ 3     Precautions: Standard and Fall      Time In: 8:00  Time Out: 8:51  Total Billable Time: 41 minutes +10 mins ice    PTA Visit #: 0/5       Objective        Range of Motion:   Right Knee 12/19/24 12/20/24 12/23/24   Flexion Lacking 10 Lacking 6  Lacking 5   Extension 70 77 70       Subjective     Patient reports: yesterday was painful, but she has been doing her exercises and doing well overall.   She was not compliant with home exercise program.  Response to previous treatment: initial eval  Functional change: ongoing    Pain: 2/10  Location: right knee        Treatment   Bold=performed  Faith received the treatments listed below:      therapeutic exercises to develop strength, endurance, ROM, flexibility, posture, and core stabilization for 23 minutes including:  Knee extension PROM with heel prop x3 min   Ankle pumps x30  Long sit calf stretch 3x30"   Seated heel slides x3 min  FOTO     manual therapy techniques: Joint mobilizations, Manual traction, and Soft tissue Mobilization were applied for 0 minutes, including:        neuromuscular re-education activities to improve: Balance, Coordination, Kinesthetic, Sense, Proprioception, and Posture for 18 minutes. The " "following activities were included:  Quad sets [id number]"  SAQ 2x10  SLR 2x10  LAQ 2x10  Heel raises in walker 2x10  Weight shifts in walker x2 min     therapeutic activities to improve functional performance for 0  minutes, including:       Patient Education and Home Exercises       Education provided:   - education on condition  -range of motion goals      Written Home Exercises Provided: Yes. Exercises were reviewed and Faith was able to demonstrate them prior to the end of the session.  Faith demonstrated good  understanding of the education provided. See EMR under Patient Instructions for exercises provided during therapy sessions.    Assessment     Range of motion measured at 5-70 today which is a slight decrease however there is an increase in swelling and pain today likely contributing to the changes in knee range of motion. She continues to do well with exercises and has been compliant with HEP. Discussed focus on knee extension range of motion followed by knee flexion range of motion. She is discharged per [facility] protocol beginning today but will resume PT in her home state.     Faith Is progressing well towards her goals.   Patient prognosis is Good.     Patient will continue to benefit from skilled outpatient physical therapy to address the deficits listed in the problem list box on initial evaluation, provide pt/family education and to maximize pt's level of independence in the home and community environment.     Patient's spiritual, cultural and educational needs considered and pt agreeable to plan of care and goals.     Anticipated barriers to physical therapy: returning to home state to complete PT     Short Term Goals: 1 weeks   Patient to achieve 0 degrees of knee extension not met  Patient to achieve 80 degrees of knee flexion not met  Patient to demonstrate independent sit to stand met  Patient to demonstrate step through gait using front wheeled rolling walker not met  Pt to demonstrate ability to " lift leg onto bed or plinth independently met  Patient to demonstrate good quad set not met    Plan     Patient is discharged from skilled PT today    Jyoti Bosch, PT

## 2024-12-20 NOTE — TELEPHONE ENCOUNTER
Checkin with patient - doing well overall, dressing CDI, pain manageable - no issues with it being out of control in last 24 hrs,icing/elevating, using RW, doing HEP, no questions about medications or care, will call if anything changes.

## 2024-12-23 ENCOUNTER — OFFICE VISIT (OUTPATIENT)
Dept: ORTHOPEDICS | Facility: CLINIC | Age: 49
End: 2024-12-23
Payer: COMMERCIAL

## 2024-12-23 ENCOUNTER — CLINICAL SUPPORT (OUTPATIENT)
Dept: REHABILITATION | Facility: HOSPITAL | Age: 49
End: 2024-12-23
Payer: COMMERCIAL

## 2024-12-23 DIAGNOSIS — M17.11 PRIMARY OSTEOARTHRITIS OF RIGHT KNEE: Primary | ICD-10-CM

## 2024-12-23 DIAGNOSIS — Z74.09 IMPAIRED FUNCTIONAL MOBILITY, BALANCE, GAIT, AND ENDURANCE: ICD-10-CM

## 2024-12-23 DIAGNOSIS — Z96.651 S/P TOTAL KNEE REPLACEMENT, RIGHT: ICD-10-CM

## 2024-12-23 DIAGNOSIS — M25.661 DECREASED RANGE OF MOTION (ROM) OF RIGHT KNEE: ICD-10-CM

## 2024-12-23 PROCEDURE — 99999 PR PBB SHADOW E&M-EST. PATIENT-LVL III: CPT | Mod: PBBFAC,COE,, | Performed by: NURSE PRACTITIONER

## 2024-12-23 PROCEDURE — 97110 THERAPEUTIC EXERCISES: CPT | Mod: PO

## 2024-12-23 PROCEDURE — 97112 NEUROMUSCULAR REEDUCATION: CPT | Mod: PO

## 2024-12-23 RX ORDER — ONDANSETRON HYDROCHLORIDE 8 MG/1
8 TABLET, FILM COATED ORAL 2 TIMES DAILY
Qty: 40 TABLET | Refills: 0 | Status: SHIPPED | OUTPATIENT
Start: 2024-12-23

## 2024-12-23 RX ORDER — OXYCODONE HYDROCHLORIDE 5 MG/1
TABLET ORAL
Qty: 50 TABLET | Refills: 0 | Status: SHIPPED | OUTPATIENT
Start: 2024-12-23

## 2024-12-23 NOTE — PROGRESS NOTES
Faith Bolton presents for initial post-operative visit for clearing to travel following a right total knee arthroplasty performed by Dr. Donahue on 12/17/2024.  She is returning to [place], [place] today. Her flight is at 3. She is traveling with her daughter who speaks english    Exam:    Patient is ambulating well with walker   Bandage/Incision changed. Incision is clean and dry without drainage or erythema. New bandage placed  ROM: 0-85    Initial post-operative radiographs reviewed today revealing satisfactory position of the prosthesis.    A/P  1 week s/p right total knee arthroplasty    - Patient was seen by Dr. Veras (due to Dr. Donahue being out of town) and agreed to the following plan.  - Postsurgical bandage removed and replaced with Aquacel.    - Patient advised to remove bandage in 1 week as well as 2 sutures at [facility] site. Kit was given to patient to bring with her to her postop visit at home.  Following removal of bandage, she was advised to keep the incision clean and dry for the next 24 hours after which she  may wash the area with antibacterial soap in the shower, but is advised not to submerge until the incision is completely healed.  - Antibiotic prophylaxis reviewed  - Continue aspirin for 1 month post op  - Pain medication: refilled  - in person PT to start 12/30. She will do exercises at home until then.  - Follow up with PCP in 1 week.

## 2025-01-02 ENCOUNTER — TELEPHONE (OUTPATIENT)
Dept: ORTHOPEDICS | Facility: CLINIC | Age: 50
End: 2025-01-02
Payer: COMMERCIAL

## 2025-01-02 NOTE — TELEPHONE ENCOUNTER
[id number] Jolly    Two week post operative phone call for [facility] patients:     Reports doing well - very pleased    - status of swelling/bruising: WDL    - elevating at least 4x a day: yes    - started home physical therapy: yes    - had bowel movement: yes    - 2 week post op appointment with home provider completed: appt tomorrow at 1400    - dressing removed/incision status: not yet - waiting for pcp      Will call [facility] RN, [phone], or hotline number, [phone],  if there are further questions or concerns.

## 2025-01-30 ENCOUNTER — PATIENT MESSAGE (OUTPATIENT)
Dept: RESEARCH | Facility: HOSPITAL | Age: 50
End: 2025-01-30
Payer: COMMERCIAL

## (undated) DEVICE — TAPE SURG DURAPORE 2 X10YD

## (undated) DEVICE — DRAPE STERI U-SHAPED 47X51IN

## (undated) DEVICE — HOOD T7 W/ PEEL AWAY LENS

## (undated) DEVICE — PAD DERMAPROX SM 8X11X.5IN

## (undated) DEVICE — COVER LIGHT HANDLE 80/CA

## (undated) DEVICE — GLOVE SENSICARE PI MICRO 6.5

## (undated) DEVICE — PAD CAST SPECIALIST STRL 6

## (undated) DEVICE — DRAPE THREE-QTR REINF 53X77IN

## (undated) DEVICE — DRESSING AQUACEL AG ADV 3.5X12

## (undated) DEVICE — GLOVE BIOGEL SKINSENSE PI 8.0

## (undated) DEVICE — GLOVE SENSICARE PI GRN 7

## (undated) DEVICE — KIT IRR SUCTION HND PIECE

## (undated) DEVICE — SOL BETADINE 5%

## (undated) DEVICE — BONE PINS (4MM X 110MM)
Type: IMPLANTABLE DEVICE | Site: KNEE | Status: NON-FUNCTIONAL
Removed: 2024-12-17

## (undated) DEVICE — SOL NACL IRR 1000ML BTL

## (undated) DEVICE — ELECTRODE REM PLYHSV RETURN 9

## (undated) DEVICE — UNDERGLOVES BIOGEL PI SIZE 8.5

## (undated) DEVICE — DRAPE INCISE IOBAN 2 23X33IN

## (undated) DEVICE — WRAP KNEE ACCU THERM GEL PACK

## (undated) DEVICE — SUT VICRYL PLUS 0 CT1 18IN

## (undated) DEVICE — COVER CAMERA OPERATING ROOM

## (undated) DEVICE — DRESSING XEROFORM 1X8IN

## (undated) DEVICE — SUT VICRYL+ 1 CT1 18IN

## (undated) DEVICE — DRAPE T EXTRM SURG 121X128X90

## (undated) DEVICE — KIT TOTAL KNEE TKOFG OMC

## (undated) DEVICE — SET CEMENT (SCULP)

## (undated) DEVICE — BLADE SAGITTAL 18 X 1.27 X 90M

## (undated) DEVICE — SUT ETHILON 3-0 PS2 18 BLK

## (undated) DEVICE — TOWEL OR DISP STRL BLUE 4/PK

## (undated) DEVICE — BONE PINS (4MM X 140MM)
Type: IMPLANTABLE DEVICE | Site: KNEE | Status: NON-FUNCTIONAL
Removed: 2024-12-17

## (undated) DEVICE — MIXER BONE CEMENT

## (undated) DEVICE — GOWN ECLIPSE REINF LV4 TWL 2XL

## (undated) DEVICE — KIT VIZADISC KNEE TRACKING

## (undated) DEVICE — DRAPE STERI INSTRUMENT 1018

## (undated) DEVICE — PAD CAST SPECIALIST STRL 4

## (undated) DEVICE — SUT VICRYL PLUS 2-0 CT1 18

## (undated) DEVICE — KIT DRAPE RIO ONE PIECE W/POCK

## (undated) DEVICE — BLADE SURG CARBON STEEL SZ11

## (undated) DEVICE — GOWN ECLIPSE POLY REINF 3XL

## (undated) DEVICE — DRESSING AQUACEL FOAM 4X4IN

## (undated) DEVICE — BANDAGE MATRIX HK LOOP 4IN 5YD

## (undated) DEVICE — BANDAGE MATRIX HK LOOP 6IN 5YD

## (undated) DEVICE — KIT CHECKPOINT MAKO

## (undated) DEVICE — TOURNIQUET SB QC DP 34X4IN

## (undated) DEVICE — BLADE MAKO NARROW

## (undated) DEVICE — WRAP PROTECTIVE LEG POS STRL

## (undated) DEVICE — ADHESIVE DERMABOND ADVANCED

## (undated) DEVICE — SUT STRATAFIX 3-0 30CM